# Patient Record
Sex: MALE | Race: WHITE | NOT HISPANIC OR LATINO | Employment: OTHER | URBAN - METROPOLITAN AREA
[De-identification: names, ages, dates, MRNs, and addresses within clinical notes are randomized per-mention and may not be internally consistent; named-entity substitution may affect disease eponyms.]

---

## 2017-01-24 ENCOUNTER — OFFICE VISIT (OUTPATIENT)
Dept: FAMILY MEDICINE | Facility: CLINIC | Age: 42
End: 2017-01-24
Payer: COMMERCIAL

## 2017-01-24 VITALS
WEIGHT: 235 LBS | HEART RATE: 80 BPM | BODY MASS INDEX: 32.9 KG/M2 | DIASTOLIC BLOOD PRESSURE: 82 MMHG | TEMPERATURE: 97.3 F | HEIGHT: 71 IN | SYSTOLIC BLOOD PRESSURE: 124 MMHG

## 2017-01-24 DIAGNOSIS — F41.1 GAD (GENERALIZED ANXIETY DISORDER): ICD-10-CM

## 2017-01-24 DIAGNOSIS — F41.9 ANXIETY: Primary | ICD-10-CM

## 2017-01-24 PROCEDURE — 99214 OFFICE O/P EST MOD 30 MIN: CPT | Performed by: FAMILY MEDICINE

## 2017-01-24 RX ORDER — VENLAFAXINE HYDROCHLORIDE 37.5 MG/1
37.5 CAPSULE, EXTENDED RELEASE ORAL DAILY
Qty: 30 CAPSULE | Refills: 1 | Status: SHIPPED | OUTPATIENT
Start: 2017-01-24 | End: 2020-07-27

## 2017-01-24 ASSESSMENT — ANXIETY QUESTIONNAIRES
5. BEING SO RESTLESS THAT IT IS HARD TO SIT STILL: MORE THAN HALF THE DAYS
1. FEELING NERVOUS, ANXIOUS, OR ON EDGE: MORE THAN HALF THE DAYS
3. WORRYING TOO MUCH ABOUT DIFFERENT THINGS: NOT AT ALL
GAD7 TOTAL SCORE: 15
2. NOT BEING ABLE TO STOP OR CONTROL WORRYING: MORE THAN HALF THE DAYS
7. FEELING AFRAID AS IF SOMETHING AWFUL MIGHT HAPPEN: NEARLY EVERY DAY
6. BECOMING EASILY ANNOYED OR IRRITABLE: NEARLY EVERY DAY

## 2017-01-24 ASSESSMENT — PATIENT HEALTH QUESTIONNAIRE - PHQ9: 5. POOR APPETITE OR OVEREATING: NEARLY EVERY DAY

## 2017-01-24 NOTE — NURSING NOTE
"Chief Complaint   Patient presents with     Elbow Pain     Saturday sprain his elbow opening a drawer. He used a sport wrap on it and work noticed and wanted him to be checked.     Anxiety     panic attacks. Has a very stressful fast paced job. Synptoms of frustration, anger is starting to affect daily life.        Initial /82 mmHg  Pulse 80  Temp(Src) 97.3  F (36.3  C) (Tympanic)  Ht 5' 10.75\" (1.797 m)  Wt 235 lb (106.595 kg)  BMI 33.01 kg/m2 Estimated body mass index is 33.01 kg/(m^2) as calculated from the following:    Height as of this encounter: 5' 10.75\" (1.797 m).    Weight as of this encounter: 235 lb (106.595 kg).  BP completed using cuff size: LISA Duenas ([facility])  "

## 2017-01-24 NOTE — PATIENT INSTRUCTIONS
Please start the Effexor XR 37.5 mg a day.    Follow up in 2 weeks with a phone visit.    If you have a bad side effect of this medication, please call me and I will switch to something like sertraline medication.          Thank you for choosing [facility].  You may be receiving a survey in the mail from Milagro Henning regarding your visit today.  Please take a few minutes to complete and return the survey to let us know how we are doing.      If you have questions or concerns, please contact us via [email or web address] or you can contact your care team at [phone].    Our Clinic hours are:  Monday 6:40 am  to 7:00 pm  Tuesday -Friday 6:40 am to 5:00 pm    The [place] outpatient lab hours are:  Monday - Friday 6:10 am to 4:45 pm  Saturdays 7:00 am to 11:00 am  Appointments are required, call [phone]    If you have clinical questions after hours or would like to schedule an appointment,  call the clinic at [phone].

## 2017-01-24 NOTE — Clinical Note
[facility]  [address] [address]  Phone: [phone]    January 24, 2017      RE: Williams LYONS Joby  [address] [place] [id number]       To whom it may concern:    Williams Hemphill was seen in our clinic today. He may return to work with the following: No working or lifting restrictions on 1/24/2017.          Sincerely,    Chu Ga MD

## 2017-01-24 NOTE — MR AVS SNAPSHOT
After Visit Summary   1/24/2017    Williams Hemphill    MRN: [id number]           Patient Information     Date Of Birth          1975        Visit Information        Provider Department      1/24/2017 10:20 AM Chu Ga MD [facility]        Today's Diagnoses     Anxiety    -  1     JANNIE (generalized anxiety disorder)           Care Instructions    Please start the Effexor XR 37.5 mg a day.    Follow up in 2 weeks with a phone visit.    If you have a bad side effect of this medication, please call me and I will switch to something like sertraline medication.          Thank you for choosing [facility].  You may be receiving a survey in the mail from [employer] regarding your visit today.  Please take a few minutes to complete and return the survey to let us know how we are doing.      If you have questions or concerns, please contact us via Media Temple or you can contact your care team at [phone].    Our Clinic hours are:  Monday 6:40 am  to 7:00 pm  Tuesday -Friday 6:40 am to 5:00 pm    The [place] outpatient lab hours are:  Monday - Friday 6:10 am to 4:45 pm  Saturdays 7:00 am to 11:00 am  Appointments are required, call [phone]    If you have clinical questions after hours or would like to schedule an appointment,  call the clinic at [phone].          Follow-ups after your visit        Who to contact     If you have questions or need follow up information about today's clinic visit or your schedule please contact [facility] directly at [phone].  Normal or non-critical lab and imaging results will be communicated to you by MobileHelphart, letter or phone within 4 business days after the clinic has received the results. If you do not hear from us within 7 days, please contact the clinic through Media Temple or phone. If you have a critical or abnormal lab result, we will notify you by phone as soon as possible.  Submit refill requests through Media Temple  "or call your pharmacy and they will forward the refill request to us. Please allow 3 business days for your refill to be completed.          Additional Information About Your Visit        MyChart Information     Ztoryhart lets you send messages to your doctor, view your test results, renew your prescriptions, schedule appointments and more. To sign up, go to [email or web address] . Click on \"Log in\" on the left side of the screen, which will take you to the Welcome page. Then click on \"Sign up Now\" on the right side of the page.     You will be asked to enter the access code listed below, as well as some personal information. Please follow the directions to create your username and password.     Your access code is: [email or web address]  Expires: 2017 10:55 AM     Your access code will  in 90 days. If you need help or a new code, please call your [place] clinic or [phone].        Care EveryWhere ID     This is your Care EveryWhere ID. This could be used by other organizations to access your [place] medical records  [id number]        Your Vitals Were     Pulse Temperature Height BMI (Body Mass Index)          80 97.3  F (36.3  C) (Tympanic) 5' 10.75\" (1.797 m) 33.01 kg/m2         Blood Pressure from Last 3 Encounters:   17 124/82   01/15/16 129/86   12/29/15 124/79    Weight from Last 3 Encounters:   17 235 lb (106.595 kg)   01/15/16 247 lb 9.6 oz (112.311 kg)   11/11/15 243 lb (110.224 kg)              Today, you had the following     No orders found for display         Today's Medication Changes          These changes are accurate as of: 17 10:55 AM.  If you have any questions, ask your nurse or doctor.               Start taking these medicines.        Dose/Directions    venlafaxine 37.5 MG 24 hr capsule   Commonly known as:  EFFEXOR-XR   Used for:  Anxiety, JANNIE (generalized anxiety disorder)   Started by:  Chu Ga MD        Dose:  37.5 mg   Take 1 capsule (37.5 mg) by " mouth daily   Quantity:  30 capsule   Refills:  1         Stop taking these medicines if you haven't already. Please contact your care team if you have questions.     cyclobenzaprine 10 MG tablet   Commonly known as:  FLEXERIL   Stopped by:  Chu Ga MD           HYDROcodone-acetaminophen 5-325 MG per tablet   Commonly known as:  NORCO   Stopped by:  Chu Ga MD           IBUPROFEN PO   Stopped by:  Chu Ga MD                Where to get your medicines      These medications were sent to [facility] [place] - [place], [place] - [address]  [address] [address]     Phone:  [phone]    - venlafaxine 37.5 MG 24 hr capsule             Primary Care Provider Office Phone # Fax #    Chu Ga [phone]       [facility] [address] [facility] [address] [facility] [address]        Thank you!     Thank you for choosing [facility]  for your care. Our goal is always to provide you with excellent care. Hearing back from our patients is one way we can continue to improve our services. Please take a few minutes to complete the written survey that you may receive in the mail after your visit with us. Thank you!             Your Updated Medication List - Protect others around you: Learn how to safely use, store and throw away your medicines at www.disposemymeds.org.          This list is accurate as of: 1/24/17 10:55 AM.  Always use your most recent med list.                   Brand Name Dispense Instructions for use    acetaminophen 500 MG tablet    TYLENOL    100 tablet    Take 2 tablets (1,000 mg) by mouth every 8 hours       venlafaxine 37.5 MG 24 hr capsule    EFFEXOR-XR    30 capsule    Take 1 capsule (37.5 mg) by mouth daily

## 2017-01-24 NOTE — PROGRESS NOTES
SUBJECTIVE:                                                    Williams Hemphill is a 41 year old male who presents to clinic today for the following health issues:  Chief Complaint   Patient presents with     Elbow Pain     Saturday sprain his elbow opening a drawer. He used a sport wrap on it and work noticed and wanted him to be checked.     Anxiety     panic attacks. Has a very stressful fast paced job. Synptoms of frustration, anger is starting to affect daily life.          Abnormal Mood Symptoms      Duration: on going > one year    Description:  Depression: no   Anxiety: YES  Panic attacks: YES     Accompanying signs and symptoms: see PHQ-9 and JANNIE scores    History (similar episodes/previous evaluation): Paxil use in the past, not helpful    Precipitating or alleviating factors: Fast paced stressful job    Therapies tried and outcome: meditation    Has anxiety.    Feels like panic attacks at times.  Work is stressful.  There is still some financial issues.  He has quit smoking, no caffeine.  No agoraphobia.  Has been on paxil and a similar med and it made him feel numb.  He has siblings on a fast acting medication they use prn.  Wondering about this product.     Musculoskeletal problem/pain      Duration: 1/21    Description  Location: left elbow    Intensity:  1/10    Accompanying signs and symptoms: swelling-better now    History  Previous similar problem: no   Previous evaluation:  none    Precipitating or alleviating factors:  Trauma or overuse: YES- while opening a drawer felt it strain or twisted  Aggravating factors include: lifting    Therapies tried and outcome: ice and support wrap           Problem list and histories reviewed & adjusted, as indicated.  Additional history: as documented    Problem list, Medication list, Allergies, and Medical/Social/Surgical histories reviewed in [facility] and updated as appropriate.    ROS:  CONSTITUTIONAL:NEGATIVE for fever, chills, change in  "weight  INTEGUMENTARY/SKIN: NEGATIVE for worrisome rashes, moles or lesions  MUSCULOSKELETAL: left elbow, resolved  NEURO: NEGATIVE for weakness, dizziness or paresthesias  PSYCHIATRIC: as above    OBJECTIVE:                                                    /82 mmHg  Pulse 80  Temp(Src) 97.3  F (36.3  C) (Tympanic)  Ht 5' 10.75\" (1.797 m)  Wt 235 lb (106.595 kg)  BMI 33.01 kg/m2  Body mass index is 33.01 kg/(m^2).  GENERAL APPEARANCE: healthy, alert and no distress  MS: extremities normal- no gross deformities noted, full rom of left elbow, no findings,   SKIN: no suspicious lesions or rashes  NEURO: Normal strength and tone, mentation intact and speech normal  PSYCH: mentation appears normal and affect normal/bright         ASSESSMENT/PLAN:                                                    1. Anxiety  Discussed anxiety, medication, reason for not using benzos, due to potential addiction, etc, handouts given.  Patient Instructions   Please start the Effexor XR 37.5 mg a day.    Follow up in 2 weeks with a phone visit.    If you have a bad side effect of this medication, please call me and I will switch to something like sertraline medication.          Thank you for choosing [facility].  You may be receiving a survey in the mail from [employer] regarding your visit today.  Please take a few minutes to complete and return the survey to let us know how we are doing.      If you have questions or concerns, please contact us via DocDep or you can contact your care team at [phone].    Our Clinic hours are:  Monday 6:40 am  to 7:00 pm  Tuesday -Friday 6:40 am to 5:00 pm    The [place] outpatient lab hours are:  Monday - Friday 6:10 am to 4:45 pm  Saturdays 7:00 am to 11:00 am  Appointments are required, call [phone]    If you have clinical questions after hours or would like to schedule an appointment,  call the clinic at [phone].        - venlafaxine (EFFEXOR-XR) 37.5 MG 24 hr capsule; " Take 1 capsule (37.5 mg) by mouth daily  Dispense: 30 capsule; Refill: 1    2. JANNIE (generalized anxiety disorder)    - venlafaxine (EFFEXOR-XR) 37.5 MG 24 hr capsule; Take 1 capsule (37.5 mg) by mouth daily  Dispense: 30 capsule; Refill: 1    Left biceps tendon strain resolved  See Patient Instructions  Counseling/Coordination of care is over 15 min in 25 min appt.      Chu Ga MD  [facility]

## 2017-01-25 ASSESSMENT — ANXIETY QUESTIONNAIRES: GAD7 TOTAL SCORE: 15

## 2017-01-25 ASSESSMENT — PATIENT HEALTH QUESTIONNAIRE - PHQ9: SUM OF ALL RESPONSES TO PHQ QUESTIONS 1-9: 5

## 2017-01-31 PROBLEM — F41.1 GAD (GENERALIZED ANXIETY DISORDER): Status: ACTIVE | Noted: 2017-01-31

## 2018-01-08 ENCOUNTER — HOSPITAL ENCOUNTER (OUTPATIENT)
Dept: OCCUPATIONAL THERAPY | Facility: CLINIC | Age: 43
Setting detail: THERAPIES SERIES
End: 2018-01-08
Attending: ORTHOPAEDIC SURGERY
Payer: OTHER MISCELLANEOUS

## 2018-01-08 PROCEDURE — 97110 THERAPEUTIC EXERCISES: CPT | Mod: GO | Performed by: OCCUPATIONAL THERAPIST

## 2018-01-08 PROCEDURE — 97140 MANUAL THERAPY 1/> REGIONS: CPT | Mod: GO | Performed by: OCCUPATIONAL THERAPIST

## 2018-01-08 PROCEDURE — 97165 OT EVAL LOW COMPLEX 30 MIN: CPT | Mod: GO | Performed by: OCCUPATIONAL THERAPIST

## 2018-01-08 PROCEDURE — 40000839 ZZH STATISTIC HAND THERAPY VISIT: Performed by: OCCUPATIONAL THERAPIST

## 2018-01-08 NOTE — PROGRESS NOTES
Hand Therapy Initial Evaluation  01/08/18    General Information/History   Start Of Care Date 01/08/18   Referring Physician Dr. Vela   Orders Date 12/26/18   Medical Diagnosis R ECU subluxation/dislocation    Precautions/Limitations work restriction   Additional Occupational Profile Info/Pertinent history of current problem Pt states he was at work, and working with a large roll of paper when there was a malfunction and pt was trying to stop the roll, his arm was caught between the roll of paper and a fixed juan.  There were broken bones and originally had surgery 6/25 and then had surgery 11/22 for the ECU tendon as well as a CTR.  Pt stating nerve damage with the first surgery with decreased sensation along radial FA and wrist   Previous treatment or current condition pt was initially casted after first surgery, as well as after the most recent surgery.  Has had no therapy.     Past medical history none   How/Where did it occur At work   Onset date of current episode/exacerbation 06/23/18   Date of surgery 11/22/18   Surgical procedure s/p ECU centralization and CTR   Chronicity New   Hand Dominance Right   Affected side Right   Functional limitations perform activities of daily living;perform required work activities;perform desired leisure / sports activities   Reported Symptoms Pain;Loss of Motion/Stiffness;Loss of strength;Numbness;Edema   QuickDASH [Functional Disability Questionnaire; 0-100 (0=no dysfunction; 100=dysfunction)] (UEFI 32/80)   Prior level of function Independent ADL;Independent IADL   Important Activities football, basketball, hiking   Living environment [place]   Patient role/Employment history Employed   Occupation    Employment Status Working in normal job with restrictions  (no use of the Right hand)   Primary Job Tasks Assembly;Gripping/pinching;Pushing/pulling;Prolonged standing;Repetitive tasks;Operating a machine;Lifting;Reaching;Carrying   Patient/Family goals  statement to get back to normal activities, full use of my hand and wrist   Fall Risk Screen   Fall screen completed by OT   Have you fallen 2 or more times in the past year? No   Have you fallen and had an injury in the past year? No   Is patient a fall risk? No   Pain   Pain Primary Pain Report   Primary Pain Report   Location dorsal ulnar and central wrist   Radiation Hand  (small finger)   Pain Quality Aching;Sharp;Shooting   Frequency Intermittent   Scale 8/10  (at worst, 0/10 at best)   Pain Is Worse In The P.m.  (after a days use)   Pain Is Exacerbated By Twisting , Pulling;Gripping;Pinching  (reaching)   Pain Is Relieved By Rest;Ice   Progression Since Onset Gradually Improving   Edema   Edema Distal Wrist Crease   Distal Wrist Crease (measured in cm)   Distal Wrist Crease - - Left 18.5   Distal Wrist Crease - - Right 20   Scar/Wound   Scar/Wound Comments scars to volar wrist, dorsal ulnar wrist and volar FA; all healed with mild adhesions   Tenderness   Overall - Right ECU tendon and attatchment.  Radial head and PIN pain 4/10   Comment Tightness noted in wrist flexors and extensors   ROM   ROM AROM   AROM   AROM Wrist   Comments Pt is able to make a full fist on the right   Wrist   Wrist Extension - Left 72   Wrist Extension - Right 50   Wrist Flexion- Left 71   Wrist Flexion - Right 19   Wrist Supination- Left 87   Wrist Supination - Right 62   Wrist Pronation- Left 85   Wrist Pronation - Right 32   Radial Deviation- Left 20   Radial Deviation - Right 6   Ulnar Deviation- Left 40   Ulnar Deviation - Right 26   Mobility Testing   Mobility Testing (decreased R radial head mobility )   Sensation Findings   Sensation Findings Other (see comments)   Sensation Comments light touch intact, numbness radial FA-wrist-thumb MCP, slight numbness distal ulnar FA-wrist-MCP of SF.  also numbness over volar wrist incision   Strength   Strength Other (see comments)   Strength Comments deferred at this time   Resisted  Muscle Testing   Resisted Muscle Testing (Deferred at this time)   Education Assessment   Preferred Learning Style Listening;Demonstration   Barriers to Learning No barriers   Therapy Interventions   Planned Therapy Interventions Ultrasound;Light Therapy;Paraffin;Fluidotherapy;Strengthening;ROM;Coordination;Stretching;Manual Therapy;Splinting;Education of splint wear, care, fit and precautions;Scar Management;Edema Management;Desensitization;Self Care/Home Management;Adaptive Equipment Training;Energy Conservation/Work Simplifacation Training;Joint Protection Instruction;Home Program  (neuro-dhruv)   Clinical Impression   Criteria for Skilled Therapeutic Interventions Met yes;treatment indicated   OT Diagnosis impaired ADL's   Influenced by the following impairments Pain;Edema;Decreased range of motion;Decreased strength;Impaired sensation  (decreased coordination)   Assessment of Occupational Performance 5 or more Performance Deficits   Identified Performance Deficits work, home management, bathing, functional mobility, cooking, dressing, recreation/hobbies   Clinical Decision Making (Complexity) Low complexity   Therapy Frequency 2x/wk for 4 weeks, decreasing to 1x/wk   Predicted Duration of Therapy Intervention (days/wks) 8 weeks   Risks and Benefits of Treatment have been explained. Yes   Patient, Family & other staff in agreement with plan of care Yes   Clinical Impression Comments Pt presents to therapy with above impairments, following multi-trauma injury at work with multiple surgeries.  Impairments are impacting his ability to complete ADL's and IADL's per PLOF and pt would benefit from skilled services to address impairments and return to IND PLOF.   Hand Goals   Hand Goals Household Chores;Work   Household Chores   Current Functional Task Gripping;Turning   Previous Performance Level Independent   Current Performance Level Severe difficulty  (6/10)   Goal Target Task Open a tight or new jar   Goal Target  Performance Level No difficulty  (pain less than 1/10)   Due Date 03/05/18   Work   Current Functional Task Reaching;Lifting   Previous Performance Level Independent   Current Performance Level Unable   Goal Target Task Hold and manipulate necessary tools  (managing paper rolls)   Goal Target Performance Level No difficulty   Due Date 03/05/18   Total Evaluation Time   Total Evaluation Time (Minutes) 25     Kimberly Hewitt OTR/L

## 2018-01-15 ENCOUNTER — HOSPITAL ENCOUNTER (OUTPATIENT)
Dept: OCCUPATIONAL THERAPY | Facility: CLINIC | Age: 43
Setting detail: THERAPIES SERIES
End: 2018-01-15
Attending: ORTHOPAEDIC SURGERY
Payer: OTHER MISCELLANEOUS

## 2018-01-15 PROCEDURE — 40000839 ZZH STATISTIC HAND THERAPY VISIT: Performed by: OCCUPATIONAL THERAPIST

## 2018-01-15 PROCEDURE — 97140 MANUAL THERAPY 1/> REGIONS: CPT | Mod: GO | Performed by: OCCUPATIONAL THERAPIST

## 2018-01-15 PROCEDURE — 97110 THERAPEUTIC EXERCISES: CPT | Mod: GO | Performed by: OCCUPATIONAL THERAPIST

## 2018-01-15 PROCEDURE — 97022 WHIRLPOOL THERAPY: CPT | Mod: GO | Performed by: OCCUPATIONAL THERAPIST

## 2018-01-22 ENCOUNTER — HOSPITAL ENCOUNTER (OUTPATIENT)
Dept: OCCUPATIONAL THERAPY | Facility: CLINIC | Age: 43
Setting detail: THERAPIES SERIES
End: 2018-01-22
Attending: ORTHOPAEDIC SURGERY
Payer: OTHER MISCELLANEOUS

## 2018-01-22 PROCEDURE — 97110 THERAPEUTIC EXERCISES: CPT | Mod: GO | Performed by: OCCUPATIONAL THERAPIST

## 2018-01-22 PROCEDURE — 97140 MANUAL THERAPY 1/> REGIONS: CPT | Mod: GO | Performed by: OCCUPATIONAL THERAPIST

## 2018-01-22 PROCEDURE — 40000839 ZZH STATISTIC HAND THERAPY VISIT: Performed by: OCCUPATIONAL THERAPIST

## 2018-01-22 PROCEDURE — 97022 WHIRLPOOL THERAPY: CPT | Mod: GO | Performed by: OCCUPATIONAL THERAPIST

## 2018-01-29 ENCOUNTER — HOSPITAL ENCOUNTER (OUTPATIENT)
Dept: OCCUPATIONAL THERAPY | Facility: CLINIC | Age: 43
Setting detail: THERAPIES SERIES
End: 2018-01-29
Attending: ORTHOPAEDIC SURGERY
Payer: OTHER MISCELLANEOUS

## 2018-01-29 PROCEDURE — 40000839 ZZH STATISTIC HAND THERAPY VISIT: Performed by: OCCUPATIONAL THERAPIST

## 2018-01-29 PROCEDURE — 97110 THERAPEUTIC EXERCISES: CPT | Mod: GO | Performed by: OCCUPATIONAL THERAPIST

## 2018-01-29 PROCEDURE — 97140 MANUAL THERAPY 1/> REGIONS: CPT | Mod: GO | Performed by: OCCUPATIONAL THERAPIST

## 2018-02-02 ENCOUNTER — HOSPITAL ENCOUNTER (OUTPATIENT)
Dept: OCCUPATIONAL THERAPY | Facility: CLINIC | Age: 43
Setting detail: THERAPIES SERIES
End: 2018-02-02
Attending: ORTHOPAEDIC SURGERY
Payer: OTHER MISCELLANEOUS

## 2018-02-02 PROCEDURE — 97022 WHIRLPOOL THERAPY: CPT | Mod: GO | Performed by: OCCUPATIONAL THERAPIST

## 2018-02-02 PROCEDURE — 97140 MANUAL THERAPY 1/> REGIONS: CPT | Mod: GO | Performed by: OCCUPATIONAL THERAPIST

## 2018-02-02 PROCEDURE — 97110 THERAPEUTIC EXERCISES: CPT | Mod: GO | Performed by: OCCUPATIONAL THERAPIST

## 2018-02-02 PROCEDURE — 40000839 ZZH STATISTIC HAND THERAPY VISIT: Performed by: OCCUPATIONAL THERAPIST

## 2018-02-16 ENCOUNTER — HOSPITAL ENCOUNTER (OUTPATIENT)
Dept: OCCUPATIONAL THERAPY | Facility: CLINIC | Age: 43
Setting detail: THERAPIES SERIES
End: 2018-02-16
Attending: ORTHOPAEDIC SURGERY
Payer: OTHER MISCELLANEOUS

## 2018-02-16 PROCEDURE — 97140 MANUAL THERAPY 1/> REGIONS: CPT | Mod: GO | Performed by: OCCUPATIONAL THERAPIST

## 2018-02-16 PROCEDURE — 40000839 ZZH STATISTIC HAND THERAPY VISIT: Performed by: OCCUPATIONAL THERAPIST

## 2018-02-21 ENCOUNTER — HOSPITAL ENCOUNTER (OUTPATIENT)
Dept: OCCUPATIONAL THERAPY | Facility: CLINIC | Age: 43
Setting detail: THERAPIES SERIES
End: 2018-02-21
Attending: ORTHOPAEDIC SURGERY
Payer: OTHER MISCELLANEOUS

## 2018-02-21 PROCEDURE — 40000839 ZZH STATISTIC HAND THERAPY VISIT: Performed by: OCCUPATIONAL THERAPIST

## 2018-02-21 PROCEDURE — 97110 THERAPEUTIC EXERCISES: CPT | Mod: GO | Performed by: OCCUPATIONAL THERAPIST

## 2018-02-21 NOTE — PROGRESS NOTES
02/21/18 0500   Notes   Note Type Discharge Summary   Providers   Providers Stephanie Storm, OTR/L, CHT   Referring Physician Dr. Vela   General Information   Rxs Authorized 12 (POC)16 (4-8-17)   Rxs Used 7   Medical Diagnosis R ECU subluxation/dislocation    Insurance WC   Start Of Care Date 01/08/18   Onset date of current episode/exacerbation 06/23/18   Surgical procedure s/p ECU centralization and CTR   Date of surgery 11/22/18   Date for Next MD Appointment 03/07/18   Precautions/Limitations 5# weight restriction for 6 more weeks   Other pertinent information s/p 9w6d   Subjective Measures   Subjective Patient feels like he is doing good. Feels he has good motion, no pain and is functional and ready for discharge.   Initial Pain level 8/10   Current Pain level 0/10   Objective Measures   Objective Measures ROM;Strength;Objective Measure 1;Objective Measure 2;Objective Measure 3   ROM   Location (anatomical) wrist   Location Right   Motion Ext/Flex   ROM Comments 60/50 (WAS 50/19)   Strength   Location Right;Left    122,138   Scruggs Pinch 25,26   Lateral Pinch 25,32   Objective Measure 1   Objective Measure Sup/pro   Details 77/70 ( WAS 62/32   Objective Measure 2   Objective Measure RD/UD   Details 25/30 (Was 6/26)   Therapeutic Exercise   Therapeutic Exercise ROM/AROM   Skilled Interventions To Increase Blood Flow For Improved Healing;To Increase Tissue Extensibility;To Increase Tissue Excursion;To Increase Strength;To Enhance Tendon Gliding;To Enhance Tissue Repair;To Enhance Joint Rom;To Soften Scar Tissue;To Decrease Pain;To Decrease Hypersensitivity   Minutes of Treatment 10 to review home program   ROM/AROM   AROM Wrist All Planes   Sets/Reps 10 reps;HEP   AROM Forearm All Planes   Sets/Reps 10 reps;HEP   AROM Elbow Extension;Flexion   Sets/Reps 10 reps;HEP   PROM   PROM PROM Stretching   PROM Stretching 2 sets;HEP  (wrist flex and ext; with weight, against table or other hand)   Special Techniques    Special Techniques TENDON GLIDING SPECIAL TECHNIQUES   Tendon Gliding Hook fist  (hook series)   Sets/Reps 10 reps;HEP   Strengthening   Strengthening Isotonics Wrist;Isotonics Forearm   Isotonics Wrist All Planes   Sets/Reps 1 set;10 reps   Resistance 1   Isotonics Forearm All Planes   Sets/Reps 1 set;10 reps   Resistance 8 oz  (hammer)   Manual   MFR (IO membrane)   Scar Mob Position Sitting   Scar Mob Location volar wrist and FA, dorsal wrist   Description/ Technique circular;3 dimensions  (ed for HEP)   Hand Goals   Hand Goals Household Chores;Work   Household Chores   Current Functional Task Gripping;Turning   Previous Performance Level Moderate limitations   Current Performance Level (no difficulty)   Goal Target Task Open a tight or new jar   Goal Target Performance Level No difficulty  (pain less than 1/10)   Due Date 03/05/18   Date Goal Met 02/21/18   Work   Current Functional Task Reaching;Lifting   Previous Performance Level Unable   Current Performance Level (no diff)   Goal Target Task Hold and manipulate necessary tools  (managing paper rolls)   Goal Target Performance Level No difficulty   Due Date 03/05/18   Date Goal Met 02/21/18   Assessment   Clinical Impression(s) Comments Patient has made significant progress in all areas and has met functional goals.   Response to Therapy: Improvements Flexibility;Strength;Edema;Pain;Work Performance;Self Care Skills;ROM   Education   Learner Patient   Readiness Eager;Acceptance   Method Explanation;Demonstration   Response Verbalizes understanding   Plan   Homework AROM, massage   Updates to plan of care continue to use within pain tolerance   Plan D/C to home program   Total Session Time   Timed Code Treatment Minutes 10   Total Treatment Time (sum of timed and untimed services) 10   Stephanie Storm OTR/L CHT  Occupational Therapist, Certified Hand Therapist

## 2019-12-12 ENCOUNTER — OFFICE VISIT (OUTPATIENT)
Dept: URGENT CARE | Facility: URGENT CARE | Age: 44
End: 2019-12-12
Payer: COMMERCIAL

## 2019-12-12 VITALS
HEART RATE: 70 BPM | RESPIRATION RATE: 18 BRPM | OXYGEN SATURATION: 98 % | DIASTOLIC BLOOD PRESSURE: 84 MMHG | SYSTOLIC BLOOD PRESSURE: 126 MMHG | TEMPERATURE: 97.8 F

## 2019-12-12 DIAGNOSIS — R07.9 CHEST PAIN, UNSPECIFIED TYPE: Primary | ICD-10-CM

## 2019-12-12 DIAGNOSIS — I45.9 SKIPPED HEART BEATS: ICD-10-CM

## 2019-12-12 PROCEDURE — 99215 OFFICE O/P EST HI 40 MIN: CPT | Performed by: NURSE PRACTITIONER

## 2019-12-12 PROCEDURE — 93000 ELECTROCARDIOGRAM COMPLETE: CPT | Performed by: NURSE PRACTITIONER

## 2019-12-12 RX ORDER — BUPRENORPHINE AND NALOXONE 8; 2 MG/1; MG/1
3 FILM, SOLUBLE BUCCAL; SUBLINGUAL DAILY
Status: ON HOLD | COMMUNITY
End: 2021-08-06

## 2019-12-13 NOTE — NURSING NOTE
"Chief Complaint   Patient presents with     Palpitations     Has been going on for a week. Worse today.  Headache today.  Chest tightness today.  No arm pain.  Under lots of stress. Feels a flutter right now, and skips.        Initial /84 (BP Location: Right arm, Patient Position: Chair, Cuff Size: Adult Large)   Pulse 70   Temp 97.8  F (36.6  C) (Tympanic)   Resp 18   SpO2 98%  Estimated body mass index is 33.01 kg/m  as calculated from the following:    Height as of 1/24/17: 1.797 m (5' 10.75\").    Weight as of 1/24/17: 106.6 kg (235 lb).    Patient presents to the clinic using No DME    Health Maintenance that is potentially due pending provider review:  NONE    n/a    Is there anyone who you would like to be able to receive your results? No  If yes have patient fill out WILFRED  Francisco Mota M.A.        "
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
## 2019-12-18 ENCOUNTER — OFFICE VISIT (OUTPATIENT)
Dept: FAMILY MEDICINE | Facility: CLINIC | Age: 44
End: 2019-12-18
Payer: COMMERCIAL

## 2019-12-18 VITALS
BODY MASS INDEX: 36.31 KG/M2 | WEIGHT: 259.4 LBS | HEART RATE: 64 BPM | DIASTOLIC BLOOD PRESSURE: 78 MMHG | HEIGHT: 71 IN | RESPIRATION RATE: 14 BRPM | SYSTOLIC BLOOD PRESSURE: 136 MMHG | TEMPERATURE: 98.5 F | OXYGEN SATURATION: 98 %

## 2019-12-18 DIAGNOSIS — R00.2 PALPITATIONS: Primary | ICD-10-CM

## 2019-12-18 DIAGNOSIS — E66.9 NON MORBID OBESITY, UNSPECIFIED OBESITY TYPE: ICD-10-CM

## 2019-12-18 PROCEDURE — 99214 OFFICE O/P EST MOD 30 MIN: CPT | Performed by: FAMILY MEDICINE

## 2019-12-18 ASSESSMENT — MIFFLIN-ST. JEOR: SCORE: 2075.82
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
## 2019-12-18 NOTE — PATIENT INSTRUCTIONS
Thank you for choosing [facility].  You may be receiving an email and/or telephone survey request from [facility] Customer Experience regarding your visit today.  Please take a few minutes to respond to the survey to let us know how we are doing.    Contact Cardiac Services  at [phone], to schedule zio patch monitor placement appointment.    If you have persistent chest pain, persistent or worsening breathing difficulty, palpitation, lightheadedness or other concerning symptoms, you should be brought to the ER.    To lose weight  Be consistent with low trans fat and saturated fat diet.  Eat food rich in omega-3-fatty acids as you tolerate. (salmon, olive oil)  Eat 5 cups of vegetables, fruits and whole grains per day.  Half of your plate each meal should be vegetables or fruits or whole grains or a combination of those.  Limit starchy food (white rice, white bread, white pasta, white potatoes) to less than a cup per meal.  Minimize sweets, junk food and fastfood. Limit soda beverages to one serving per day; best to avoid it altogether though.  Exercise: moderate intensity sustained for at least 30 mins per episode, goal of 150 mins per week at least  Combine cardiovascular and resistance exercises.  These exercise recommendations are in addition to your daily activity at work or home.      If you have questions or concerns, please contact us via Gleam or you can contact your care team at [phone].    Our Clinic hours are:  Monday 6:40 am  to 7:00 pm  Tuesday -Friday 6:40 am to 5:00 pm    The [place] outpatient lab hours are:  Monday - Friday 6:10 am to 4:45 pm  Saturdays 7:00 am to 11:00 am  Appointments are required, call [phone]    If you have clinical questions after hours or would like to schedule an appointment,  call the clinic at [phone].    Patient Education     Heart Palpitations    Palpitations are the feeling that your heart is beating hard, fast, or irregular.  "Some describe it as \"pounding\" or \"skipped beats.\" Palpitations may occur in someone with heart disease, but can also occur in a healthy person.  Heart-related causes:    Arrhythmia (a change from the heart's normal rhythm)    Heart valve disease    Disease of the heart muscle    Coronary artery disease    High blood pressure  Non-heart-related causes:    Certain medicines such as asthma inhalers and decongestants    Some herbal supplements, energy drinks and pills, and weight loss pills    Illegal stimulant drugs such as cocaine, crank, methamphetamine, PCP, bath salts, or ecstasy    Caffeine, alcohol, and tobacco    Medical conditions such as thyroid disease, anemia, anxiety, and panic disorder  Sometimes the cause can't be found.  Home care  Follow these home care tips:    Don't use too much caffeine, alcohol, tobacco, or any stimulant drugs.    Tell your doctor about any prescription or over-the-counter or herbal medicines you take.  Follow-up care    Follow up with your doctor, or as advised.  Call 911  This is the fastest and safest way to get to the emergency department. The paramedics can also begin treatment on the way to the hospital, if needed.  Don't wait until your symptoms are severe to call 911. These are reasons to call 911:    Chest pain    Shortness of breath    Feeling lightheaded, faint, or dizzy    Fainting or loss of consciousness    Very irregular heartbeat    Rapid heartbeat that makes you uncomfortable    Slower than usual heart rate associated with symptoms    Slower than usual heart rate    Chest pain with weakness, dizziness, heavy sweating, nausea, or vomiting    Extreme drowsiness or confusion    Weakness of an arm or leg, or on 1 side of the face    Difficulty with speech or vision  When to seek medical advice  Call your healthcare provider right away if you have palpitations and any of the following:    Weakness    Dizziness    Lightheadedness    Fainting  Date Last Reviewed: " 4/27/2016 2000-2018 [employer]. [address] [place] [address], [place] [address]. All rights reserved. This information is not intended as a substitute for professional medical care. Always follow your healthcare professional's instructions.           Patient Education     Weight Management: Getting Started  Healthy bodies come in all shapes and sizes. Not all bodies are made to be thin. For some people, a healthy weight is higher than the average weight listed on weight charts. Your healthcare provider can help you decide on a healthy weight for you.    Reasons to lose weight  Losing weight can help with some health problems, such as high blood pressure, heart disease, diabetes, sleep apnea, and arthritis. You may also feel more energy.  Set your long-term goal  Your goal doesn't even have to be a specific weight. You may decide on a fitness goal (such as being able to walk 10 miles a week), or a health goal (such as lowering your blood pressure). Choose a goal that is measurable and reasonable, so you know when you've reached it. A goal of reaching a BMI of less than 25 is not always reasonable (or possible).   Make an action plan  Habits don t change overnight. Setting your goals too high can leave you feeling discouraged if you can t reach them. Be realistic. Choose one or two small changes you can make now. Set an action plan for how you are going to make these changes. When you can stick to this plan, keep making a few more small changes. Taking small steps will help you stay on the path to success.  Track your progress  Write down your goals. Then, keep a daily record of your progress. Write down what you eat and how active you are. This record lets you look back on how much you ve done. It may also help when you re feeling frustrated. Reward yourself for success. Even if you don t reach every goal, give yourself credit for what you do get done.  Get support  Encouragement from others can help make  losing weight easier. Ask your family members and friends for support. They may even want to join you. Also look to your healthcare provider, registered dietitian, and  for help. Your local hospital can give you more information about nutrition, exercise, and weight loss. Be sure to get a thorough checkup before you start any exercise program or change your diet.  Date Last Reviewed: 4/1/2018 2000-2018 [employer]. [address] [place] [address], [place] [address]. All rights reserved. This information is not intended as a substitute for professional medical care. Always follow your healthcare professional's instructions.           Patient Education     Weight Management: Healthy Eating  Food is your body s fuel. You can t live without it. The key is to give your body enough nutrients and energy without eating too much. Reading food labels can help you make healthy choices. Also, learn new eating habits to manage your weight. Nutrition labels are being redesigned by the FDA to emphasize the number of calories being consumed as well as the amount of more nutrients, such as added sugars, vitamin D, and potassium.     All the values on the label are based on one serving. The serving size is the average portion. Remember to multiply the values on the label by the number of servings you eat.   Eat less fat  A gram of fat has almost 2.5 times the calories of a gram of protein or carbohydrates. Try to balance your food choices so that only 20% to 35% of your calories comes from total fat. This means an average of 2  to 3  grams of fat for each 100 calories you eat.  Eat more fiber  High-fiber foods are digested more slowly than low-fiber foods, so you feel full longer. Try to get at least 25 grams of fiber each day for a 2000 calorie diet. Foods high in fiber include:    Vegetables and fruits    Whole-grain or bran breads, pastas, and cereals    Legumes (beans) and peas  As you start to eat  more fiber, be sure to drink plenty of water to keep your digestive system working smoothly.  Tips  Do's and don'ts include:     Don t skip meals. This often leads to overeating later on. It s best to spread your eating throughout the day.    Eat a variety of foods, not just a few favorites.    If you find yourself eating when you re not hungry, ask yourself why. Many of us eat when we re bored, stressed, or just to be polite. Listen to your body. If you re not hungry, get busy doing something else instead of eating.    Eat slower, shooting for 20 to 30 minutes for each meal. It takes 20 minutes for your stomach to tell your brain that it s full. Slow eaters tend to eat less and are still satisfied, while fast eaters may tend to be overeaters.     Pay attention to what you eat. Don t read or watch TV during your meal.  Date Last Reviewed: 4/1/2018 2000-2018 [employer]. [address] [place] [address]. All rights reserved. This information is not intended as a substitute for professional medical care. Always follow your healthcare professional's instructions.           Patient Education     Weight Management: Exercise and Activity    Studies show that people who exercise are the most likely to lose weight and keep it off. Exercise burns calories. It helps build muscle to make your body stronger. Make exercise an important part of your weight-management plan.  Make activity part of your day  You may not think you have the time to exercise. But you can work activity into your daily life--you just need to be committed. Take 10 minutes out of your lunch hour to take a walk. Walk to the [employer] to get your paper instead of having it delivered. Make it a habit to take the stairs instead of the elevator. Park in a far away parking spot instead of the closest. You ll be surprised at how fast these little changes can make a difference.  Some people really cannot walk very far, and tire out  quickly with exercise. Instead of becoming discouraged, resolve to do what you can do, and work to make that a regular frequent habit.   The benefits of exercise  Exercise offers many benefits including:     Exercise increases your metabolism (the speed at which your body burns calories).    Regular exercise can increase the amount of muscle in your body. Muscle burns calories faster than fat. The more muscle you have, the more calories you burn.    Exercise gives you energy and curbs your appetite.    Exercise decreases stress and helps you sleep better. Find out for yourself what time of day works best for you.  Make exercise fun  Exercise can be fun. Choose an activity you enjoy. You may even get a friend to do it with you:    Take a resistance-training or aerobics class    Join a team sport    Take a dance class    Walk the dog    Ride a bike  If you have health problems, be sure to ask your healthcare provider before you start an exercise program. Have a  help you develop a plan that s safe for you.   Date Last Reviewed: 4/1/2018 2000-2018 [employer]. [address] [place] [address], [place] [address]. All rights reserved. This information is not intended as a substitute for professional medical care. Always follow your healthcare professional's instructions.

## 2019-12-23 PROBLEM — E66.9 NON MORBID OBESITY, UNSPECIFIED OBESITY TYPE: Status: ACTIVE | Noted: 2019-12-23

## 2020-03-22 ENCOUNTER — VIRTUAL VISIT (OUTPATIENT)
Dept: FAMILY MEDICINE | Facility: OTHER | Age: 45
End: 2020-03-22

## 2020-03-23 NOTE — PROGRESS NOTES
"Date: 2020 20:32:05  Clinician: VERONICA Toure  Clinician NPI: [id number]  Patient: Williams Hemphill  Patient : 1975  Patient Address: [address] [place] [id number]  Patient Phone: [phone]  Visit Protocol: URI  Patient Summary:  Williams is a 44 year old ( : 1975 ) male who initiated a Visit for cold, sinus infection, or influenza. When asked the question \"Please sign me up to receive news, health information and promotions. \", Williams responded \"No\".    Williams states his symptoms started suddenly 3-6 days ago.   His symptoms consist of rhinitis, enlarged lymph nodes, facial pain or pressure, a sore throat, a cough, nasal congestion, malaise, and a headache. He is experiencing difficulty breathing due to nasal congestion but he is not short of breath.   Symptom details     Nasal secretions: The color of his mucus is white.    Cough: Williams coughs every 5-10 minutes and his cough is not more bothersome at night. Phlegm does not come into his throat when he coughs. He believes his cough is caused by post-nasal drip.     Sore throat: Williams reports having moderate throat pain (4-6 on a 10 point pain scale), does not have exudate on his tonsils, and can swallow liquids. The lymph nodes in his neck are enlarged. A rash has not appeared on the skin since the sore throat started.     Facial pain or pressure: The facial pain or pressure does not feel worse when bending or leaning forward.     Headache: He states the headache is moderate (4-6 on a 10 point pain scale).      Williams denies having ear pain, myalgias, wheezing, chills, teeth pain, and fever. He also denies double sickening (worsening symptoms after initial improvement), taking antibiotic medication for the symptoms, and having recent facial or sinus surgery in the past 60 days.   Precipitating events  Within the past week, Williams has not been exposed to someone with strep throat. He has not recently been exposed to someone with " influenza. Williams has not been in close contact with any high risk individuals.   Pertinent COVID-19 (Coronavirus) information  Williams has not traveled internationally or to the areas where COVID-19 (Coronavirus) is widespread, including cruise ship travel in the last 14 days before the start of his symptoms.   Williams has not had a close contact with a laboratory-confirmed COVID-19 patient within 14 days of symptom onset. He also has not had a close contact with a suspected COVID-19 patient within 14 days of symptom onset.   Williams is not a healthcare worker and does not work in a healthcare facility.   Pertinent medical history  Williams needs a return to work/school note.   Weight: 235 lbs   Williams does not smoke or use smokeless tobacco.   Weight: 235 lbs    MEDICATIONS: ibuprofen oral, ALLERGIES: NKDA  Clinician Response:  Dear Williams,   Based on the information you have provided, you do have symptoms that are consistent with Coronavirus (COVID-19).  The coronavirus causes mild to severe respiratory illness with the most common symptoms including fever, cough and difficulty breathing. Unfortunately, many viruses cause similar symptoms and it can be difficult to distinguish between viruses, especially in mild cases, so we are presuming that anyone with cough or fever has coronavirus at this time.  Coronavirus/COVID-19 has reached the point of community spread in [place], meaning that we are finding the virus in people with no known exposure risk for logan the virus. Given the increasing commonness of coronavirus in the community we are no longer testing patients who are not critically ill.  If you are a health care worker, you should refer to your employee health office for instructions about testing and returning to work.  For everyone else who has cough or fever, you should assume you are infected with coronavirus. Since you will not be tested but have symptoms that may be consistent with  coronavirus, the CDC recommends you stay in self-isolation until these three things have happened:    You have had no fever for at least 72 hours (that is three full days of no fever without the use of medicine that reduces fevers)    AND   Other symptoms have improved (for example, when your cough or shortness of breath have improved)   AND   At least 7 days have passed since your symptoms first appeared.   How to Isolate:   Isolate yourself at home.  Do Not allow any visitors  Do Not go to work or school  Do Not go to Judaism,  centers, shopping, or other public places.  Do Not shake hands.  Avoid close contact with others (hugging, kissing).   Protect Others:   Cover Your Mouth and Nose with a mask, disposable tissue or wash cloth to avoid spreading germs to others.  Wash your hands and face frequently with soap and water.   We know it can be scary to hear that you might have COVID-19. Our team can help track your symptoms and make sure you are doing ok over the next two weeks using a program called [employer] to keep in touch. When you receive an email from [employer], please consider enrolling in our monitoring program. There is no cost to you for monitoring. Here is a URL where you can learn more: [email or web address]  Managing Symptoms:   At this time, we primarily recommend Tylenol (Acetaminophen) for fever or pain. If you have liver or kidney problems, contact your primary care provider for instructions on use of tylenol. Adults can take 650 mg (two 325 mg pills) by mouth every 4-6 hours as needed OR 1,000 mg (two 500 mg pills) every 8 hours as needed. MAXIMUM DAILY DOSE: 3,000mg. For children, refer to dosing on bottle based on age or weight.   If you develop significant shortness of breath that prevents you from doing normal activities, please call 911 or proceed to the nearest emergency room and alert them immediately that you have been in self-isolation for possible coronavirus.   For more information about COVID19 and options for caring for yourself at home, please visit the CDC website at https://www.cdc.gov/coronavirus/2019-ncov/about/steps-when-sick.htmlFor more options for care at [facility], please visit our website at [email or web address]    Diagnosis: Cough  Diagnosis ICD: R05  Prescription: acetaminophen (Tylenol Extra Strength) 500 mg oral tablet 60 tablet, 0 days supply. Take 2 tablets by mouth every 8 hours as needed for fever and pain. Refills: 0, Refill as needed: no, Allow substitutions: yes  Prescription: fluticasone propionate (Flonase Allergy Relief) 50 mcg/actuation nasal spray,suspension 1 60 spray aerosol with adapter, 0 days supply. Inhale 1 spray in each nostril intranasally 1 time per day as needed. Refills: 0, Refill as needed: no, Allow substitutions: yes  Prescription: Ayr Saline Nasal Neti Rinse sinus irrigation packet with rinse device 1 40 packet kit, 0 days supply. Take 1 packet with rinse device 2 times per day as needed for nasal congestion. Refills: 0, Refill as needed: no, Allow substitutions: yes  Prescription: benzonatate (Tessalon Perles) 100 mg oral capsule 45 capsule, 0 days supply. Take 1 capsule by mouth 3 times per day as needed for cough. Refills: 0, Refill as needed: no, Allow substitutions: yes  Pharmacy: [facility] [place] - [phone] - [address], [place] [id number]

## 2020-05-04 ENCOUNTER — TELEPHONE (OUTPATIENT)
Dept: FAMILY MEDICINE | Facility: CLINIC | Age: 45
End: 2020-05-04

## 2020-05-04 NOTE — TELEPHONE ENCOUNTER
Reason for call:    Symptom or request:     Patient called stating that he faxed a letter to clinic, to be off of work due to son's exposure to Covid. Son lives with Dad.  He is requesting to  form vs having it fax. Does not want to confuse work.     The fax was sent at 10a on Monday.     Best Time:  any    Can we leave a detailed message on this number?  YES     Nohemy COLLINS  Station

## 2020-05-06 NOTE — TELEPHONE ENCOUNTER
Tried to contact patient, but only got a recording that the subscriber is not accepting calls. Marni Kerr on 5/6/2020 at 3:30 PM

## 2020-05-06 NOTE — TELEPHONE ENCOUNTER
Form received.  However note below states:  Patient called stating that he faxed a letter to clinic, to be off of work due to son's exposure to Covid. Son lives with Dad.  He is requesting to  form vs having it fax. Does not want to confuse work.     Form indicates he would like the last progress exam notes from his visits faxed to his employer along with form (form is in Pending Phone Call tray on forms desk).    Patient as last seen by our team 12/18/19, had a virtual visit 3/22/20 ([facility]?) and then was seen 4/20/20 by a [facility] beds at the [facility] location.      I'm not sure if patient is requesting time off for son's exposure to COVID, his office visit 12/18, [facility] visit 3/22 or his outside facility visit 4/20.    It was too late to contact patient.  Can we call patient to see what he needs?  Thank you!

## 2020-05-08 NOTE — TELEPHONE ENCOUNTER
Gladis was asking:  I'm not sure if patient is requesting time off for son's exposure to COVID, his office visit 12/18, [facility] visit 3/22 or his outside facility visit 4/20.      Attempted to contact patient, no answer, left voice message to call back.

## 2020-05-08 NOTE — TELEPHONE ENCOUNTER
Patient called back-- asking for time off from 04/18-05/14/2020 due separate exposures from son wife and himself.  And the need to self quarantine from each exposure. Please provide last OV/on care note.    Nohemy COLLINS  Station

## 2020-05-13 NOTE — TELEPHONE ENCOUNTER
Patient notified.  Requesting just the [facility] notes be faxed to his work.  Patient given HIMS # to contact.    Christiana THRASHER RN BSN

## 2020-05-13 NOTE — TELEPHONE ENCOUNTER
"It was too late to notify patient.  Can we notify patient of Dr. Ga's response to patient's request to complete a Medical Status form.    \"I am not able to complete the form.  I have not seen this patient is over 3 years.  In addition I have no other documentation regarding his son or wife. Dr. Ga\".      Thank you,  Gladis Brionesor  "

## 2020-06-23 ENCOUNTER — COMMUNICATION - HEALTHEAST (OUTPATIENT)
Dept: SCHEDULING | Facility: CLINIC | Age: 45
End: 2020-06-23

## 2020-06-24 ENCOUNTER — VIRTUAL VISIT (OUTPATIENT)
Dept: FAMILY MEDICINE | Facility: OTHER | Age: 45
End: 2020-06-24

## 2020-06-24 NOTE — PROGRESS NOTES
"Date: 2020 06:09:17  Clinician: Gladis Batista  Clinician NPI: [id number]  Patient: Williams Hemphill  Patient : 1975  Patient Address: [address] [place] [address]  Patient Phone: [phone]  Visit Protocol: URI  Patient Summary:  Williams is a 44 year old ( : 1975 ) male who initiated a Visit for COVID-19 (Coronavirus) evaluation and screening. When asked the question \"Please sign me up to receive news, health information and promotions. \", Williams responded \"No\".    Williams states his symptoms started 1-2 days ago.   His symptoms consist of nausea, vomiting, malaise, a headache, chills, a sore throat, myalgia, a cough, and nasal congestion. He is experiencing difficulty breathing due to nasal congestion but he is not short of breath. Williams also feels feverish.   Symptom details     Nasal secretions: The color of his mucus is white.    Cough: Williams coughs every 5-10 minutes and his cough is more bothersome at night. Phlegm does not come into his throat when he coughs. He does not believe his cough is caused by post-nasal drip.     Sore throat: Williams reports having moderate throat pain (4-6 on a 10 point pain scale), does not have exudate on his tonsils, and can swallow liquids. He is not sure if the lymph nodes in his neck are enlarged. A rash has not appeared on the skin since the sore throat started.     Temperature: His current temperature is 100.3 degrees Fahrenheit. Williams has had a temperature over 100 degrees Fahrenheit for 1-2 days.     Headache: He states the headache is moderate (4-6 on a 10 point pain scale).      Williams denies having wheezing, teeth pain, ageusia, diarrhea, rhinitis, ear pain, anosmia, and facial pain or pressure. He also denies having recent facial or sinus surgery in the past 60 days, taking antibiotic medication in the past month, and having a sinus infection within the past year.   Precipitating events  Within the past week, Williams has not been " exposed to someone with strep throat. He has not recently been exposed to someone with influenza. Williams has not been in close contact with any high risk individuals.   Pertinent COVID-19 (Coronavirus) information  In the past 14 days, Williams has not worked in a congregate living setting.   He does not work or volunteer as healthcare worker or a  and does not work or volunteer in a healthcare facility.   Williams also has not lived in a congregate living setting in the past 14 days. He does not live with a healthcare worker.   Williams has had a close contact with a laboratory-confirmed COVID-19 patient within 14 days of symptom onset. Additional information about contact with COVID-19 (Coronavirus) patient as reported by the patient (free text): two second hand exposures from a friend here at home and sister here at home.   Pertinent medical history  Williams needs a return to work/school note.   Weight: 225 lbs   Williams does not smoke or use smokeless tobacco.   Weight: 225 lbs    MEDICATIONS: No current medications, ALLERGIES: NKDA  Clinician Response:  Dear Williams,      Your symptoms show that you may have coronavirus (COVID-19). This illness can cause fever, cough and trouble breathing. Many people get a mild case and get better on their own. Some people can get very sick.  What should I do?  We would like to test you for this virus.   1. Please call [phone] to schedule your visit. Explain that you were referred by [facility] to have a COVID-19 test. Be ready to share your [facility] visit ID number.  The following will serve as your written order for this COVID Test, ordered by me, for the indication of suspected COVID [Z20.828]: The test will be ordered in [facility], our electronic health record, after you are scheduled. It will show as ordered and authorized by Umair Oquendo MD.  Order: COVID-19 (Coronavirus) PCR for SYMPTOMATIC testing from [facility].      2. When it's time for your COVID test:  Stay at  "least 6 feet away from others. (If someone will drive you to your test, stay in the backseat, as far away from the  as you can.)   Cover your mouth and nose with a mask, tissue or washcloth.  Go straight to the testing site. Don't make any stops on the way there or back.      3.Starting now: Stay home and away from others (self-isolate) until:   You've had no fever---and no medicine that reduces fever---for 3 full days (72 hours). And...   Your other symptoms have gotten better. For example, your cough or breathing has improved. And...   At least 10 days have passed since your symptoms started.       During this time, don't leave the house except for testing or medical care.   Stay in your own room, even for meals. Use your own bathroom if you can.   Stay away from others in your home. No hugging, kissing or shaking hands. No visitors.  Don't go to work, school or anywhere else.    Clean \"high touch\" surfaces often (doorknobs, counters, handles, etc.). Use a household cleaning spray or wipes. You'll find a full list of  on the EPA website: www.epa.gov/pesticide-registration/list-n-disinfectants-use-against-sars-cov-2.   Cover your mouth and nose with a mask, tissue or washcloth to avoid spreading germs.  Wash your hands and face often. Use soap and water.  Caregivers in these groups are at risk for severe illness due to COVID-19:  o People 65 years and older  o People who live in a nursing home or long-term care facility  o People with chronic disease (lung, heart, cancer, diabetes, kidney, liver, immunologic)  o People who have a weakened immune system, including those who:   Are in cancer treatment  Take medicine that weakens the immune system, such as corticosteroids  Had a bone marrow or organ transplant  Have an immune deficiency  Have poorly controlled HIV or AIDS  Are obese (body mass index of 40 or higher)  Smoke regularly   o Caregivers should wear gloves while washing dishes, handling laundry " and cleaning bedrooms and bathrooms.  o Use caution when washing and drying laundry: Don't shake dirty laundry, and use the warmest water setting that you can.  o For more tips, go to www.cdc.gov/coronavirus/2019-ncov/downloads/10Things.pdf.      How can I take care of myself?   Get lots of rest. Drink extra fluids (unless a doctor has told you not to).   Take Tylenol (acetaminophen) for fever or pain. If you have liver or kidney problems, ask your family doctor if it's okay to take Tylenol.   Adults can take either:    650 mg (two 325 mg pills) every 4 to 6 hours, or...   1,000 mg (two 500 mg pills) every 8 hours as needed.    Note: Don't take more than 3,000 mg in one day. Acetaminophen is found in many medicines (both prescribed and over-the-counter medicines). Read all labels to be sure you don't take too much.   For children, check the Tylenol bottle for the right dose. The dose is based on the child's age or weight.    If you have other health problems (like cancer, heart failure, an organ transplant or severe kidney disease): Call your specialty clinic if you don't feel better in the next 2 days.       Know when to call 911. Emergency warning signs include:    Trouble breathing or shortness of breath Pain or pressure in the chest that doesn't go away Feeling confused like you haven't felt before, or not being able to wake up Bluish-colored lips or face.  Where can I get more information?   [facility] -- About COVID-19: [email or web address]   CDC -- What to Do If You're Sick: www.cdc.gov/coronavirus/2019-ncov/about/steps-when-sick.html   CDC -- Ending Home Isolation: www.cdc.gov/coronavirus/2019-ncov/hcp/disposition-in-home-patients.html   CDC -- Caring for Someone: www.cdc.gov/coronavirus/2019-ncov/if-you-are-sick/care-for-someone.html   [facility] -- Interim Guidance for Hospital Discharge to Home: www.health.[facility].mn.us/diseases/coronavirus/hcp/hospdischarge.pdf   [facility]  clinical trials (COVID-19 research studies): clinicalaffairs.[facility]/n-clinical-trials    Below are the COVID-19 hotlines at the [place] Department of Health ([facility]). Interpreters are available.    For health questions: Call 785-668-1006 or 1-514.731.7833 (7 a.m. to 7 p.m.) For questions about schools and childcare: Call 417-117-6504 or 1-104.528.5375 (7 a.m. to 7 p.m.)    Diagnosis: Cough  Diagnosis ICD: R05

## 2020-06-25 DIAGNOSIS — Z20.822 SUSPECTED COVID-19 VIRUS INFECTION: Primary | ICD-10-CM

## 2020-06-25 PROCEDURE — U0003 INFECTIOUS AGENT DETECTION BY NUCLEIC ACID (DNA OR RNA); SEVERE ACUTE RESPIRATORY SYNDROME CORONAVIRUS 2 (SARS-COV-2) (CORONAVIRUS DISEASE [COVID-19]), AMPLIFIED PROBE TECHNIQUE, MAKING USE OF HIGH THROUGHPUT TECHNOLOGIES AS DESCRIBED BY CMS-2020-01-R: HCPCS | Performed by: FAMILY MEDICINE

## 2020-06-25 PROCEDURE — 99207 ZZC NO CHARGE NURSE ONLY: CPT

## 2020-06-25 NOTE — LETTER
June 27, 2020        Williams Hemphill  [address] [place] [address]    This letter provides a written record that you were tested for COVID-19 on 6/25/20.       Your result was negative. This means that we didn t find the virus that causes COVID-19 in your sample. A test may show negative when you do actually have the virus. This can happen when the virus is in the early stages of infection, before you feel illness symptoms.    If you have symptoms   Stay home and away from others (self-isolate) until you meet ALL of the guidelines below:    You ve had no fever--and no medicine that reduces fever--for 3 full days (72 hours). And      Your other symptoms have gotten better. For example, your cough or breathing has improved. And     At least 10 days have passed since your symptoms started.    During this time:    Stay home. Don t go to work, school or anywhere else.     Stay in your own room, including for meals. Use your own bathroom if you can.    Stay away from others in your home. No hugging, kissing or shaking hands. No visitors.    Clean  high touch  surfaces often (doorknobs, counters, handles, etc.). Use a household cleaning spray or wipes. You can find a full list on the EPA website at www.epa.gov/pesticide-registration/list-n-disinfectants-use-against-sars-cov-2.    Cover your mouth and nose with a mask, tissue or washcloth to avoid spreading germs.    Wash your hands and face often with soap and water.    Going back to work  Check with your employer for any guidelines to follow for going back to work.    Employers: This document serves as formal notice that your employee tested negative for COVID-19, as of the testing date shown above.

## 2020-06-26 LAB
SARS-COV-2 RNA SPEC QL NAA+PROBE: NOT DETECTED
SPECIMEN SOURCE: NORMAL

## 2020-06-29 ENCOUNTER — NURSE TRIAGE (OUTPATIENT)
Dept: NURSING | Facility: CLINIC | Age: 45
End: 2020-06-29

## 2020-06-29 NOTE — TELEPHONE ENCOUNTER
Patient calls for COVID19 results from 6/25/20. Today is the first day he woke up without a fever and he is still coughing. Recommendations given per letter below.     DAYAN Mcbride RN      Coronavirus (COVID-19) Notification    Lab Result   Lab test 2019-nCoV rRt-PCR OR SARS-COV-2 PCR    Nasopharyngeal AND/OR Oropharyngeal swab is NEGATIVE for 2019-nCoV RNA [OR] SARS-COV-2 RNA (COVID-19) RNA    Your result was negative. This means that we didn't find the virus that causes COVID-19 in your sample. A test may show negative when you do actually have the virus. This can happen when the virus is in the early stages of infection, before you feel illness symptoms.    If you have symptoms   Stay home and away from others (self-isolate) until you meet ALL of the guidelines below:    You've had no fever--and no medicine that reduces fever--for 3 full days (72 hours). And      Your other symptoms have gotten better. For example, your cough or breathing has improved. And     At least 10 days have passed since your symptoms started.    During this time:    Stay home. Don't go to work, school or anywhere else.     Stay in your own room, including for meals. Use your own bathroom if you can.    Stay away from others in your home. No hugging, kissing or shaking hands. No visitors.    Clean  high touch  surfaces often (doorknobs, counters, handles, etc.). Use a household cleaning spray or wipes. You can find a full list on the EPA website at www.epa.gov/pesticide-registration/list-n-disinfectants-use-against-sars-cov-2.    Cover your mouth and nose with a mask, tissue or washcloth to avoid spreading germs.    Wash your hands and face often with soap and water.    Going back to work  Check with your employer for any guidelines to follow for going back to work.  You are sent a letter for your Employer which will serve as formal document notice that you, the employee, tested negative for COVID-19, as of the testing date shown  above.    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    Where can I get more information?     [employer] [facility]: [email or web address]    Coronavirus Basics: www.health.[facility].mn.us/diseases/coronavirus/basics.html    [facility] Hotline (250-143-0935)    DAYAN Mcbride RN

## 2020-07-07 DIAGNOSIS — Z11.59 SCREENING FOR VIRAL DISEASE: ICD-10-CM

## 2020-07-10 LAB
DEPRECATED S PYO AG THROAT QL EIA: NORMAL
SPECIMEN SOURCE: NORMAL

## 2020-07-10 NOTE — PROGRESS NOTES
"Date: 2020 13:59:17  Clinician: Shadi Perez  Clinician NPI: [id number]  Patient: Williams Hemphill  Patient : 1975  Patient Address: [address] [place] [address]  Patient Phone: [phone]  Visit Protocol: URI  Patient Summary:  Williams is a 44 year old ( : 1975 ) male who initiated a Visit for COVID-19 (Coronavirus) evaluation and screening. When asked the question \"Please sign me up to receive news, health information and promotions. \", Williams responded \"Yes\".    Williams states his symptoms started 1-2 days ago.   His symptoms consist of diarrhea, malaise, a headache, a sore throat, a cough, and nasal congestion. He is experiencing mild difficulty breathing with activities but can speak normally in full sentences.   Symptom details     Nasal secretions: The color of his mucus is white.    Cough: Williams coughs every 5-10 minutes and his cough is more bothersome at night. Phlegm does not come into his throat when he coughs. He does not believe his cough is caused by post-nasal drip.     Sore throat: Williams reports having moderate throat pain (4-6 on a 10 point pain scale), does not have exudate on his tonsils, and can swallow liquids. He is not sure if the lymph nodes in his neck are enlarged. A rash has not appeared on the skin since the sore throat started.     Headache: He states the headache is mild (1-3 on a 10 point pain scale).      Williams denies having wheezing, nausea, teeth pain, ageusia, vomiting, rhinitis, ear pain, chills, myalgias, anosmia, facial pain or pressure, and fever. He also denies having recent facial or sinus surgery in the past 60 days and taking antibiotic medication in the past month.   Precipitating events  Within the past week, Williams has not been exposed to someone with strep throat. He has not recently been exposed to someone with influenza. Williams has not been in close contact with any high risk individuals.   Pertinent COVID-19 (Coronavirus) " information  In the past 14 days, Williams has not worked in a congregate living setting.   He does not work or volunteer as healthcare worker or a  and does not work or volunteer in a healthcare facility.   Williams also has not lived in a congregate living setting in the past 14 days. He does not live with a healthcare worker.   Williams has not had a close contact with a laboratory-confirmed COVID-19 patient within 14 days of symptom onset.   Pertinent medical history  Williams needs a return to work/school note.   Weight: 225 lbs   Williams does not smoke or use smokeless tobacco.   Weight: 225 lbs    MEDICATIONS: No current medications, ALLERGIES: NKDA  Clinician Response:  Dear Williams,   Your symptoms show that you may have coronavirus (COVID-19). This illness can cause fever, cough and trouble breathing. Many people get a mild case and get better on their own. Some people can get very sick.  What should I do?  We would like to test you for this virus.   1. Please call [phone] to schedule your visit. Explain that you were referred by [facility] to have a COVID-19 test. Be ready to share your [facility] visit ID number.  The following will serve as your written order for this COVID Test, ordered by me, for the indication of suspected COVID [Z20.828]: The test will be ordered in [facility], our electronic health record, after you are scheduled. It will show as ordered and authorized by Umair Oquendo MD.  Order: COVID-19 (Coronavirus) PCR for SYMPTOMATIC testing from [facility].      2. When it's time for your COVID test:  Stay at least 6 feet away from others. (If someone will drive you to your test, stay in the backseat, as far away from the  as you can.)   Cover your mouth and nose with a mask, tissue or washcloth.  Go straight to the testing site. Don't make any stops on the way there or back.      3.Starting now: Stay home and away from others (self-isolate) until:   You've had no fever---and no medicine  "that reduces fever---for 3 full days (72 hours). And...   Your other symptoms have gotten better. For example, your cough or breathing has improved. And...   At least 10 days have passed since your symptoms started.       During this time, don't leave the house except for testing or medical care.   Stay in your own room, even for meals. Use your own bathroom if you can.   Stay away from others in your home. No hugging, kissing or shaking hands. No visitors.  Don't go to work, school or anywhere else.    Clean \"high touch\" surfaces often (doorknobs, counters, handles, etc.). Use a household cleaning spray or wipes. You'll find a full list of  on the EPA website: www.epa.gov/pesticide-registration/list-n-disinfectants-use-against-sars-cov-2.   Cover your mouth and nose with a mask, tissue or washcloth to avoid spreading germs.  Wash your hands and face often. Use soap and water.  Caregivers in these groups are at risk for severe illness due to COVID-19:  o People 65 years and older  o People who live in a nursing home or long-term care facility  o People with chronic disease (lung, heart, cancer, diabetes, kidney, liver, immunologic)  o People who have a weakened immune system, including those who:   Are in cancer treatment  Take medicine that weakens the immune system, such as corticosteroids  Had a bone marrow or organ transplant  Have an immune deficiency  Have poorly controlled HIV or AIDS  Are obese (body mass index of 40 or higher)  Smoke regularly   o Caregivers should wear gloves while washing dishes, handling laundry and cleaning bedrooms and bathrooms.  o Use caution when washing and drying laundry: Don't shake dirty laundry, and use the warmest water setting that you can.  o For more tips, go to www.cdc.gov/coronavirus/2019-ncov/downloads/10Things.pdf.    4.Sign up for [employer] [facility]. We know it's scary to hear that you might have COVID-19. We want to track your symptoms to make sure you're okay over " the next 2 weeks. Please look for an email from [employer]---this is a free, online program that we'll use to keep in touch. To sign up, follow the link in the email. Learn more at [email or web address]  How can I take care of myself?   Get lots of rest. Drink extra fluids (unless a doctor has told you not to).   Take Tylenol (acetaminophen) for fever or pain. If you have liver or kidney problems, ask your family doctor if it's okay to take Tylenol.   Adults can take either:    650 mg (two 325 mg pills) every 4 to 6 hours, or...   1,000 mg (two 500 mg pills) every 8 hours as needed.    Note: Don't take more than 3,000 mg in one day. Acetaminophen is found in many medicines (both prescribed and over-the-counter medicines). Read all labels to be sure you don't take too much.   For children, check the Tylenol bottle for the right dose. The dose is based on the child's age or weight.    If you have other health problems (like cancer, heart failure, an organ transplant or severe kidney disease): Call your specialty clinic if you don't feel better in the next 2 days.       Know when to call 911. Emergency warning signs include:    Trouble breathing or shortness of breath Pain or pressure in the chest that doesn't go away Feeling confused like you haven't felt before, or not being able to wake up Bluish-colored lips or face.  Where can I get more information?   [facility] -- About COVID-19: [email or web address]   CDC -- What to Do If You're Sick: www.cdc.gov/coronavirus/2019-ncov/about/steps-when-sick.html   CDC -- Ending Home Isolation: www.cdc.gov/coronavirus/2019-ncov/hcp/disposition-in-home-patients.html   CDC -- Caring for Someone: www.cdc.gov/coronavirus/2019-ncov/if-you-are-sick/care-for-someone.html   [facility] -- Interim Guidance for Hospital Discharge to Home: www.health.[facility].mn./diseases/coronavirus/hcp/hospdischarge.pdf   [facility] clinical trials (COVID-19 research  studies): clinicalaffairs.[facility]-clinical-trials    Below are the COVID-19 hotlines at the [place] Department of Health ([facility]). Interpreters are available.    For health questions: Call 673-388-2273 or 1-582.297.5936 (7 a.m. to 7 p.m.) For questions about schools and childcare: Call 002-898-4954 or 1-856.142.7737 (7 a.m. to 7 p.m.)       [facility] Strep Test    I am sorry you are not feeling well. Based on your lab results, you do not have strep throat. This means your symptoms are caused by a virus and not the bacteria that causes strep throat. Antibiotic medications are not effective against a viral infection and will not help symptoms improve or make the condition less contagious.  The following tips will keep you as comfortable as possible while you recover:     Rest    Drink plenty of water and other liquids    Take a hot shower to loosen congestion    Use throat lozenges    Gargle with warm salt water (1/4 teaspoon of salt per 8 ounce glass of water)    Suck on frozen items such as popsicles or ice cubes    Drink hot tea with lemon and honey     Please be seen in a clinic or urgent care if new symptoms develop, or symptoms become worse.  Call 427 or go to the emergency room if you suddenly develop a rash, are drooling or unable to swallow fluids, if you are having difficulty breathing, or feel that your throat is closing off.  Because strep throat can be easily spread to others, proof of evaluation by a provider is often requested before returning to work, school, or . The statement below summarizes my evaluation. Please print a copy of this Visit if written verification is needed.  Williams was evaluated for strep throat and lab testing was completed. As a result, strep throat was ruled out and symptoms are the result of a viral infection. Antibiotics were not prescribed because they are not an effective treatment for viral infections and will not make it less contagious. Williams can return to  work, school, or  if he has not had a fever for 24 hours without the use of a fever-reducing medication and feels well enough for daily activities.   Diagnosis: Pain in throat  Diagnosis ICD: J02.9  [facility] Results: SSCRNT: NEGATIVE: No Group A streptococcal antigen detected by immunoassay, await  culture report.  [employer] Secondary Results: CULT: No Beta Streptococcus isolated

## 2020-07-11 ENCOUNTER — VIRTUAL VISIT (OUTPATIENT)
Dept: LAB | Facility: CLINIC | Age: 45
End: 2020-07-11
Payer: COMMERCIAL

## 2020-07-11 DIAGNOSIS — R07.0 THROAT PAIN: Primary | ICD-10-CM

## 2020-07-12 LAB
BACTERIA SPEC CULT: NORMAL
SPECIMEN SOURCE: NORMAL

## 2020-07-15 DIAGNOSIS — Z11.59 SCREENING FOR VIRAL DISEASE: ICD-10-CM

## 2020-07-15 PROCEDURE — U0003 INFECTIOUS AGENT DETECTION BY NUCLEIC ACID (DNA OR RNA); SEVERE ACUTE RESPIRATORY SYNDROME CORONAVIRUS 2 (SARS-COV-2) (CORONAVIRUS DISEASE [COVID-19]), AMPLIFIED PROBE TECHNIQUE, MAKING USE OF HIGH THROUGHPUT TECHNOLOGIES AS DESCRIBED BY CMS-2020-01-R: HCPCS | Performed by: FAMILY MEDICINE

## 2020-07-15 NOTE — LETTER
July 20, 2020        Williams Hemphill  [address] [place] [address]    This letter provides a written record that you were tested for COVID-19 on 07/15/20.       Your result was negative. This means that we didn t find the virus that causes COVID-19 in your sample. A test may show negative when you do actually have the virus. This can happen when the virus is in the early stages of infection, before you feel illness symptoms.    If you have symptoms   Stay home and away from others (self-isolate) until you meet ALL of the guidelines below:    You ve had no fever--and no medicine that reduces fever--for 3 full days (72 hours). And      Your other symptoms have gotten better. For example, your cough or breathing has improved. And     At least 10 days have passed since your symptoms started.    During this time:    Stay home. Don t go to work, school or anywhere else.     Stay in your own room, including for meals. Use your own bathroom if you can.    Stay away from others in your home. No hugging, kissing or shaking hands. No visitors.    Clean  high touch  surfaces often (doorknobs, counters, handles, etc.). Use a household cleaning spray or wipes. You can find a full list on the EPA website at www.epa.gov/pesticide-registration/list-n-disinfectants-use-against-sars-cov-2.    Cover your mouth and nose with a mask, tissue or washcloth to avoid spreading germs.    Wash your hands and face often with soap and water.    Going back to work  Check with your employer for any guidelines to follow for going back to work.    Employers: This document serves as formal notice that your employee tested negative for COVID-19, as of the testing date shown above.

## 2020-07-16 LAB
SARS-COV-2 RNA SPEC QL NAA+PROBE: NOT DETECTED
SPECIMEN SOURCE: NORMAL

## 2020-07-27 ENCOUNTER — OFFICE VISIT (OUTPATIENT)
Dept: FAMILY MEDICINE | Facility: CLINIC | Age: 45
End: 2020-07-27
Payer: COMMERCIAL

## 2020-07-27 VITALS
RESPIRATION RATE: 20 BRPM | WEIGHT: 236 LBS | HEART RATE: 86 BPM | TEMPERATURE: 97.5 F | OXYGEN SATURATION: 98 % | HEIGHT: 71 IN | DIASTOLIC BLOOD PRESSURE: 92 MMHG | SYSTOLIC BLOOD PRESSURE: 138 MMHG | BODY MASS INDEX: 33.04 KG/M2

## 2020-07-27 DIAGNOSIS — J06.9 VIRAL URI WITH COUGH: Primary | ICD-10-CM

## 2020-07-27 PROCEDURE — 99213 OFFICE O/P EST LOW 20 MIN: CPT | Performed by: FAMILY MEDICINE

## 2020-07-27 ASSESSMENT — MIFFLIN-ST. JEOR: SCORE: 1969.68

## 2020-07-27 NOTE — LETTER
[facility]  [address] [address]  Phone: [phone]    July 27, 2020      Williams Hemphill  [address] [place] [id number]      Dear Mr. Hemphill    You were seen in clinic today for a respiratory infection. You are not contagious.   You may return to work without restrictions on 7-28-20.     Sincerely,    / Juan Qiu MD
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
## 2020-07-27 NOTE — PATIENT INSTRUCTIONS
Thank you for choosing [facility].  You may be receiving an email and/or telephone survey request from [facility] Customer Experience regarding your visit today.  Please take a few minutes to respond to the survey to let us know how we are doing.      If you have questions or concerns, please contact us via [facility] or you can contact your care team at [phone].    Our Clinic hours are:  Monday 6:40 am  to 7:00 pm  Tuesday -Friday 6:40 am to 5:00 pm    The [place] outpatient lab hours are:  Monday - Friday 6:10 am to 4:45 pm  Saturdays 7:00 am to 11:00 am  Appointments are required, call [phone]    If you have clinical questions after hours or would like to schedule an appointment,  call the clinic at [phone].    (J06.9) Viral URI with cough  (primary encounter diagnosis)  Comment:   Plan: we discussed the infection and this was likely a virus and you are recovering. You are not contagious.   Gradually increase activities, and stay well hydrated. Use Tylenol and the Robitussin DM for the cough.   Follow up as needed. See the work letter.

## 2020-08-14 ENCOUNTER — VIRTUAL VISIT (OUTPATIENT)
Dept: FAMILY MEDICINE | Facility: OTHER | Age: 45
End: 2020-08-14

## 2020-08-14 NOTE — PROGRESS NOTES
"Date: 2020 16:14:18  Clinician: Gerda Servin  Clinician NPI: [id number]  Patient: Williams Hemphill  Patient : 1975  Patient Address: [address] [place] [address]  Patient Phone: [phone]  Visit Protocol: URI  Patient Summary:  Williams is a 44 year old ( : 1975 ) male who initiated a Visit for COVID-19 (Coronavirus) evaluation and screening. When asked the question \"Please sign me up to receive news, health information and promotions. \", Williams responded \"Yes\".    Williams states his symptoms started today.   His symptoms consist of a headache, a sore throat, a cough, nasal congestion, and malaise.   Symptom details     Nasal secretions: The color of his mucus is white.    Cough: Williams coughs a few times an hour and his cough is not more bothersome at night. Phlegm does not come into his throat when he coughs. He does not believe his cough is caused by post-nasal drip.     Sore throat: Williams reports having mild throat pain (1-3 on a 10 point pain scale), does not have exudate on his tonsils, and can swallow liquids. He is not sure if the lymph nodes in his neck are enlarged. A rash has not appeared on the skin since the sore throat started.     Headache: He states the headache is mild (1-3 on a 10 point pain scale).      Williams denies having ear pain, chills, nausea, teeth pain, ageusia, diarrhea, vomiting, rhinitis, myalgias, anosmia, facial pain or pressure, fever, and wheezing. He also denies taking antibiotic medication in the past month and having recent facial or sinus surgery in the past 60 days. He is not experiencing dyspnea.   Precipitating events  Williams is not sure if he has been exposed to someone with strep throat. He has not recently been exposed to someone with influenza. Williams has not been in close contact with any high risk individuals.   Pertinent COVID-19 (Coronavirus) information  In the past 14 days, Williams has not worked in a congregate living setting.   " He does not work or volunteer as healthcare worker or a  and does not work or volunteer in a healthcare facility.   Williams also has not lived in a congregate living setting in the past 14 days. He does not live with a healthcare worker.   Williams has had a close contact with a laboratory-confirmed COVID-19 patient within 14 days of symptom onset.   Since December 2019, Williams and has had upper respiratory infection (URI) or influenza-like illness. Has not been diagnosed with lab-confirmed COVID-19 test      Date(s) of previous URI or influenza-like illness (free-text): 7-10-20     Symptoms Williams experienced during previous URI or influenza-like illness as reported by the patient (free-text): upper respitory        Pertinent medical history  Williams needs a return to work/school note.   Weight: 225 lbs   Williams does not smoke or use smokeless tobacco.   Weight: 225 lbs  Reason for repeat visit for the same protocol within 24 hours:  I didn't finish the last visit it kicked me off and would let me restart so I am restarting anew visit  See the History of referred by protocol and completed visits section for details on previous visits (visits currently in queue to be diagnosed will not appear in this section).    MEDICATIONS: No current medications, ALLERGIES: NKDA  Clinician Response:  Dear Williams,   Your symptoms show that you may have coronavirus (COVID-19). This illness can cause fever, cough and trouble breathing. Many people get a mild case and get better on their own. Some people can get very sick.  What should I do?  We would like to test you for this virus.   1. Please call [phone] to schedule your visit. Explain that you were [facility] to have a COVID-19 test. Be ready to share your [facility] visit ID number.  The following will serve as your written order for this COVID Test, ordered by me, for the indication of suspected COVID [Z20.828]: The test will be ordered in [facility], our  "electronic health record, after you are scheduled. It will show as ordered and authorized by Umair Oquendo MD.  Order: COVID-19 (Coronavirus) PCR for SYMPTOMATIC testing from [facility].      2. When it's time for your COVID test:  Stay at least 6 feet away from others. (If someone will drive you to your test, stay in the backseat, as far away from the  as you can.)   Cover your mouth and nose with a mask, tissue or washcloth.  Go straight to the testing site. Don't make any stops on the way there or back.      3.Starting now: Stay home and away from others (self-isolate) until:   You've had no fever---and no medicine that reduces fever---for one full day (24 hours). And...   Your other symptoms have gotten better. For example, your cough or breathing has improved. And...   At least 10 days have passed since your symptoms started.       During this time, don't leave the house except for testing or medical care.   Stay in your own room, even for meals. Use your own bathroom if you can.   Stay away from others in your home. No hugging, kissing or shaking hands. No visitors.  Don't go to work, school or anywhere else.    Clean \"high touch\" surfaces often (doorknobs, counters, handles, etc.). Use a household cleaning spray or wipes. You'll find a full list of  on the EPA website: www.epa.gov/pesticide-registration/list-n-disinfectants-use-against-sars-cov-2.   Cover your mouth and nose with a mask, tissue or washcloth to avoid spreading germs.  Wash your hands and face often. Use soap and water.  Caregivers in these groups are at risk for severe illness due to COVID-19:  o People 65 years and older  o People who live in a nursing home or long-term care facility  o People with chronic disease (lung, heart, cancer, diabetes, kidney, liver, immunologic)  o People who have a weakened immune system, including those who:   Are in cancer treatment  Take medicine that weakens the immune system, such as corticosteroids  " Had a bone marrow or organ transplant  Have an immune deficiency  Have poorly controlled HIV or AIDS  Are obese (body mass index of 40 or higher)  Smoke regularly   o Caregivers should wear gloves while washing dishes, handling laundry and cleaning bedrooms and bathrooms.  o Use caution when washing and drying laundry: Don't shake dirty laundry, and use the warmest water setting that you can.  o For more tips, go to www.cdc.gov/coronavirus/2019-ncov/downloads/10Things.pdf.    4.Sign up for [employer]. We know it's scary to hear that you might have COVID-19. We want to track your symptoms to make sure you're okay over the next 2 weeks. Please look for an email from [employer]---this is a free, online program that we'll use to keep in touch. To sign up, follow the link in the email. Learn more at [email or web address]  How can I take care of myself?   Get lots of rest. Drink extra fluids (unless a doctor has told you not to).   Take Tylenol (acetaminophen) for fever or pain. If you have liver or kidney problems, ask your family doctor if it's okay to take Tylenol.   Adults can take either:    650 mg (two 325 mg pills) every 4 to 6 hours, or...   1,000 mg (two 500 mg pills) every 8 hours as needed.    Note: Don't take more than 3,000 mg in one day. Acetaminophen is found in many medicines (both prescribed and over-the-counter medicines). Read all labels to be sure you don't take too much.   For children, check the Tylenol bottle for the right dose. The dose is based on the child's age or weight.    If you have other health problems (like cancer, heart failure, an organ transplant or severe kidney disease): Call your specialty clinic if you don't feel better in the next 2 days.       Know when to call 911. Emergency warning signs include:    Trouble breathing or shortness of breath Pain or pressure in the chest that doesn't go away Feeling confused like you haven't felt before, or not being able to wake  up Bluish-colored lips or face.  Where can I get more information?   [facility] -- About COVID-19: [email or web address]   CDC -- What to Do If You're Sick: www.cdc.gov/coronavirus/2019-ncov/about/steps-when-sick.html   [facility] -- Ending Home Isolation: www.cdc.gov/coronavirus/2019-ncov/hcp/disposition-in-home-patients.html   [facility] -- Caring for Someone: www.cdc.gov/coronavirus/2019-ncov/if-you-are-sick/care-for-someone.html   [facility] -- Interim Guidance for Hospital Discharge to Home: www.[facility].mn.[place]/diseases/coronavirus/hcp/hospdischarge.pdf   [facility] clinical trials (COVID-19 research studies): clinicalaffairs.[facility]-clinical-trials    Below are the COVID-19 hotlines at the [place] [facility] of [facility] ([facility]). Interpreters are available.    For health questions: Call 163-178-4322 or 1-500.552.9569 (7 a.m. to 7 p.m.) For questions about schools and childcare: Call 636-859-0581 or 1-816.701.3642 (7 a.m. to 7 p.m.)    Diagnosis: Cough  Diagnosis ICD: R05

## 2020-08-17 ENCOUNTER — AMBULATORY - HEALTHEAST (OUTPATIENT)
Dept: INTERNAL MEDICINE | Facility: CLINIC | Age: 45
End: 2020-08-17

## 2020-08-17 DIAGNOSIS — Z20.822 SUSPECTED 2019 NOVEL CORONAVIRUS INFECTION: ICD-10-CM

## 2020-08-18 ENCOUNTER — AMBULATORY - HEALTHEAST (OUTPATIENT)
Dept: FAMILY MEDICINE | Facility: CLINIC | Age: 45
End: 2020-08-18

## 2020-08-18 DIAGNOSIS — Z20.822 SUSPECTED 2019 NOVEL CORONAVIRUS INFECTION: ICD-10-CM

## 2020-08-21 ENCOUNTER — COMMUNICATION - HEALTHEAST (OUTPATIENT)
Dept: SCHEDULING | Facility: CLINIC | Age: 45
End: 2020-08-21

## 2020-08-21 ENCOUNTER — NURSE TRIAGE (OUTPATIENT)
Dept: NURSING | Facility: CLINIC | Age: 45
End: 2020-08-21

## 2020-08-21 NOTE — TELEPHONE ENCOUNTER
Coronavirus (COVID-19) Notification    Lab Result   Lab test 2019-nCoV rRt-PCR OR SARS-COV-2 PCR    Nasopharyngeal AND/OR Oropharyngeal swab is NEGATIVE for 2019-nCoV RNA [OR] SARS-COV-2 RNA (COVID-19) RNA    Your result was negative. This means that we didn't find the virus that causes COVID-19 in your sample. A test may show negative when you do actually have the virus. This can happen when the virus is in the early stages of infection, before you feel illness symptoms.    If you have symptoms   Stay home and away from others (self-isolate) until you meet ALL of the guidelines below:    You've had no fever--and no medicine that reduces fever--for 3 full days (72 hours). And      Your other symptoms have gotten better. For example, your cough or breathing has improved. And     At least 10 days have passed since your symptoms started.    During this time:    Stay home. Don't go to work, school or anywhere else.     Stay in your own room, including for meals. Use your own bathroom if you can.    Stay away from others in your home. No hugging, kissing or shaking hands. No visitors.    Clean  high touch  surfaces often (doorknobs, counters, handles, etc.). Use a household cleaning spray or wipes. You can find a full list on the EPA website at www.epa.gov/pesticide-registration/list-n-disinfectants-use-against-sars-cov-2.    Cover your mouth and nose with a mask, tissue or washcloth to avoid spreading germs.    Wash your hands and face often with soap and water.    Going back to work  Check with your employer for any guidelines to follow for going back to work.  You are sent a letter for your Employer which will serve as formal document notice that you, the employee, tested negative for COVID-19, as of the testing date shown above.    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    Where can I get more information?    [facility]:  [email or web address]    Coronavirus Basics: www.health.[facility]./diseases/coronavirus/basics.html    [facility] Hotline (696-014-9081)    {Name]  America Us RN  [facility]

## 2021-05-26 ENCOUNTER — RECORDS - HEALTHEAST (OUTPATIENT)
Dept: ADMINISTRATIVE | Facility: CLINIC | Age: 46
End: 2021-05-26

## 2021-06-09 NOTE — TELEPHONE ENCOUNTER
Has had contact with a covid positive person, and now woke up today with a fever and terrible headache as well as sore throat.  Would like to be tested for Covid, but does not have a PCP, and has not been seen in clinic for 3 years.  Advised [email or web address].  Advised to call back with further questions or worsening of symptoms.            COVID 19 Nurse Triage Plan/Patient Instructions    Please be aware that novel coronavirus (COVID-19) may be circulating in the community. If you develop symptoms such as fever, cough, or SOB or if you have concerns about the presence of another infection including coronavirus (COVID-19), please contact your health care provider or visit [email or web address].     Disposition/Instructions    Patient to schedule a Virtual Visit with provider. Reference Visit Selection Guide.    Thank you for taking steps to prevent the spread of this virus.  o Limit your contact with others.  o Wear a simple mask to cover your cough.  o Wash your hands well and often.    Resources    [facility]: About COVID-19: [email or web address]    CDC: What to Do If You're Sick: www.cdc.gov/coronavirus/2019-ncov/about/steps-when-sick.html    CDC: Ending Home Isolation: www.cdc.gov/coronavirus/2019-ncov/hcp/disposition-in-home-patients.html     CDC: Caring for Someone: www.cdc.gov/coronavirus/2019-ncov/if-you-are-sick/care-for-someone.html     [facility]: Interim Guidance for Hospital Discharge to Home: www.health.[facility].mn.us/diseases/coronavirus/hcp/hospdischarge.pdf    [facility] clinical trials (COVID-19 research studies): clinicalaffairs.[facility]/umn-clinical-trials     Below are the COVID-19 hotlines at the [facility] ([facility]). Interpreters are available.   o For health questions: Call 273-295-8624 or 1-298.127.4378 (7 a.m. to 7 p.m.)  o For questions about schools and childcare: Call 231-433-1360 or 1-370.697.1704 (7 a.m. to 7 p.m.)       Reason for Disposition    [1] COVID-19  infection suspected by caller or triager AND [2] mild symptoms (cough, fever, or others) AND [3] no complications or SOB    Protocols used: CORONAVIRUS (COVID-19) DIAGNOSED OR [id number] 5.16.20

## 2021-06-10 NOTE — TELEPHONE ENCOUNTER
Coronavirus (COVID-19) Notification    Reason for call  Notify of POSITIVE  COVID-19 lab result, assess symptoms,  review [facility] recommendations    Lab Result   Lab test for 2019-nCoV rRt-PCR or SARS-COV-2 PCR  Oropharyngeal AND/OR nasopharyngeal swabs were POSITIVE for 2019-nCoV RNA [OR] SARS-COV-2 RNA (COVID-19) RNA     We have been unable to reach Patient by phone at this time to notify of their Positive COVID-19 result.  Left voicemail message requesting a call back to [phone] [facility] for results.        POSITIVE COVID-19 Letter sent.    [Name]  Shaun Saab RN  "[employer]  COVID19 Results Team RN  Ph# [phone]

## 2021-08-02 ENCOUNTER — HOSPITAL ENCOUNTER (EMERGENCY)
Facility: CLINIC | Age: 46
Discharge: HOME | End: 2021-08-02
Attending: PSYCHIATRY & NEUROLOGY | Admitting: PSYCHIATRY & NEUROLOGY
Payer: MEDICAID

## 2021-08-02 VITALS
HEART RATE: 79 BPM | DIASTOLIC BLOOD PRESSURE: 76 MMHG | OXYGEN SATURATION: 99 % | TEMPERATURE: 97.8 F | SYSTOLIC BLOOD PRESSURE: 131 MMHG | RESPIRATION RATE: 18 BRPM

## 2021-08-02 DIAGNOSIS — F19.20 CHEMICAL DEPENDENCY (H): ICD-10-CM

## 2021-08-02 DIAGNOSIS — F19.959 PSYCHOACTIVE SUBSTANCE-INDUCED PSYCHOSIS (H): ICD-10-CM

## 2021-08-02 PROCEDURE — 99285 EMERGENCY DEPT VISIT HI MDM: CPT | Mod: 25 | Performed by: PSYCHIATRY & NEUROLOGY

## 2021-08-02 PROCEDURE — 250N000013 HC RX MED GY IP 250 OP 250 PS 637: Performed by: PSYCHIATRY & NEUROLOGY

## 2021-08-02 PROCEDURE — 90791 PSYCH DIAGNOSTIC EVALUATION: CPT

## 2021-08-02 PROCEDURE — 99284 EMERGENCY DEPT VISIT MOD MDM: CPT | Performed by: PSYCHIATRY & NEUROLOGY

## 2021-08-02 RX ORDER — OLANZAPINE 5 MG/1
5 TABLET ORAL AT BEDTIME
Qty: 10 TABLET | Refills: 0 | Status: ON HOLD | OUTPATIENT
Start: 2021-08-02 | End: 2021-08-06

## 2021-08-02 RX ORDER — OLANZAPINE 5 MG/1
5 TABLET, ORALLY DISINTEGRATING ORAL ONCE
Status: COMPLETED | OUTPATIENT
Start: 2021-08-02 | End: 2021-08-02

## 2021-08-02 RX ORDER — LISINOPRIL 10 MG/1
10 TABLET ORAL DAILY
Status: ON HOLD | COMMUNITY
End: 2021-08-06

## 2021-08-02 RX ADMIN — OLANZAPINE 5 MG: 5 TABLET, ORALLY DISINTEGRATING ORAL at 18:49

## 2021-08-02 ASSESSMENT — ENCOUNTER SYMPTOMS
DECREASED CONCENTRATION: 1
GASTROINTESTINAL NEGATIVE: 1
NERVOUS/ANXIOUS: 1
SLEEP DISTURBANCE: 1
CARDIOVASCULAR NEGATIVE: 1
NEUROLOGICAL NEGATIVE: 1
RESPIRATORY NEGATIVE: 1
EYES NEGATIVE: 1
MUSCULOSKELETAL NEGATIVE: 1
HALLUCINATIONS: 1
CONSTITUTIONAL NEGATIVE: 1

## 2021-08-02 NOTE — ED NOTES
"8/2/2021  Williams Hemphill 1975     [facility] Crisis Assessment:    Started at: 5:59 PM  Completed at: 6:17 PM  Patient was assessed via virtually ([employer] cart or other teleconferencing device).    Chief Complaint and History of Presenting Problem:  Patient is a 45 year old  male who presented to the ED by Medics related to concerns for depression, paranoia and suicidal ideation.    Pt reports he was at the ED in [place], [place], on 8/1/2021 and was given Zyprexa and discharged. Pt reports marital strain and recently has been using meth and cocaine again on occasion. Reports an incident recently with his wife and children regarding a security camera video he believes they may have altered to \"make me feel like I am crazy\". He reports he was going to go camping to \"get away and clear my head\". Reports he could not reach his wife and kids this morning, called his mom and told her \"if one of them does not answer I am going to blow my head off\". She called police and pt was brought by ambulance to ED.         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 He reports since December of 2020 experiencing marital strain with his wife. Reports 17 years he got sober from all drugs and alcohol. Reports last 5 years he has been on suboxone. Reports a few months ago being increasingly depressed turned to drinking which led him to using cocaine and meth on occasion.     Mental Health History and Current Symptoms   Patient identifies historical diagnoses of MDD and JANNIE. At baseline, patient describes their mental health symptoms as manageable on day to day basis. " Currently pt reports anxiety, depression, anger/frustration and grief/loss. Pt reports using on occasion and reports desire to taper off suboxone maintenance and be sober.     Mental Health History (prior psychiatric hospitalizations, civil commitments, programmatic care, etc): Pt reports residential and outpatient treatment for addiction 16-17 years ago.   Family Mental and Chemical Health History: uknown; did not assess    Current and Historic Psychotropic Medications: Pt repoerts currently taking suboxone  Medication Adherent: Yes and pt reports a few months ago stopping suboxone and lexparo   Recent medication changes? No    Current Providers  Primary Care Provider: Yes, Dr. Harris  Psychiatrist: No  Therapist: No  : No  ARMHS: No  ACT Team: No  Other: No    Has an WILFRED been signed? No       Relevant Medical Concerns  Patient identifies concerns with completing ADLs? No  Patient can ambulate independently? Yes  Other medical health concerns? No  History of concussion or TBI? No     Trauma History   Physical, Emotional, or Sexual abuse: No  Loss of a friend or family member to suicide: No  Other identified traumatic event or significant stressor: Yes    Current Symptoms  Attention, Hyperactivity, and Impulsivity: No   Anxiety:Yes: Panic attacks and Generalized Symptoms: Avoidance, Cognitive anxiety - feelings of doom, racing thoughts, difficulty concentrating , Excessive worry and Physiological anxiety - sweating, flushing, shaking, shortness of breath, or racing heart    Behavioral Difficulties: Yes: Agitation, Anger Problems and Impulsivity/Disinhibition   Mood Symptoms: Yes: Appetite change/weight change , Feelings of helplessness , Feelings of hopelessness , Feelings of worthlessness , Impaired decision making , Increased irritability/agitation, Low self esteem , Risky behaviors, Sad, depressed mood  and Sleep disturbance    Appetite: Yes: Loss of Appetite   Feeding and Eating: No  Interpersonal  Functioning: Yes: Impaired Impulse Control and Impaired Interpersonal Functioning  Learning Disabilities/Cognitive/Developmental Disorders: No   General Cognitive Impairments: No  If yes, see completed Mini-Cog Assessment below.  Sleep: Yes: Difficulty falling asleep  and Difficulty staying sleep    Psychosis: No    Trauma: No     Substance Use History and Treatments  Pt reports 17 years ago history of residential CD treatment and was attending 12 step meetings. History of alcohol use, cocaine use and meth use. Last use of cocaine 7/29/21 and last use of meth reported 8/1/2021    Patient has recently completed a drug screen or BAL/Breathalyzer? No    History of Suicidal Ideation, Suicide Attempts, and Risk Formulation:   Patient does  have a history of suicidal ideation beginning December of 2020. Patient is able to identify triggers to suicidal ideation which include marital strain, anger, sadness and frustration. Patient does not acknowledge a history of suicide attempts. Patient does not have a history of self-injurious behavior.     ESS-6  1.a. Over the past 2 weeks, have you had thoughts of killing yourself? Yes   1.b. Have you ever attempted to kill yourself and, if yes, when did this last happen? No  2. Recent or current suicide plan? No  3. Recent or current intent to act on ideation? No  4. Lifetime psychiatric hospitalization? Yes  5. Pattern of excessive substance use? Yes  6. Current irritability, agitation, or aggression? No  ESS-6 Score: 3    Patient identifies current suicidal ideation which includes making statement to his mother earier today. He denies any SI leading up to this morning and denies any active SI in ED, pt reports he is desperate and emotional and that is why he made the statement to his mother. Patient reports onset of ideation was December of 2020, frequency is sometimes,  duration is fleeting, and intensity is mild. Patient does not have an identified plan for suicide. Patient  denies any intent to self harm or act on suicidal thoughts.    Current risk factors for suicide include loss of relationship, separation/divorce, etc., poor interpersonal relationships, helplessness/hopelessness and rage, anger, seeking revenge. Protective factors against suicide include strong bond to family/friends, community support, employment, responsibilities to others (spouse, pets, children, etc.) and future oriented towards goals, hopes, dreams.    Other Risk Areas  Aggressive/assumptive/homicidal risk factors: No   Duty to warn?No   Was a Child Protection Report Made? No   Was a Adult Protection Report Made? No      Sexually inappropriate behavior? No      Vulnerability to sexual exploitation? No     Mental Status Exam:  Affect: Appropriate  Appearance: Appropriate   Attention Span/Concentration: Attentive    Eye Contact: Variable  Fund of Knowledge: Appropriate   Language /Speech Content: Fluent  Language /Speech Volume: Normal   Language /Speech Rate/Productions: Normal   Recent Memory: Intact  Remote Memory: Intact  Mood: Anxious   Orientation:   Person: Yes   Place: Yes  Time of Day: Yes   Date: Yes   Situation (Do they understand why they are here?): Yes   Psychomotor Behavior: Normal   Thought Content: Clear  Thought Form: Intact    Assessments and Screeners  Mini-Cog Assessment: Remove if not applicable (complete as standard for individuals 65+ )  N/A    Clinical Summary and Disposition  Clinical summary (include strengths, protective factors, community resources, and assessment of vulnerability/risk):   Pt is 45 year old male with history of JANNIE And MDD. Pt was alert and oriented during assessment. Pt denied HI, SIB And hallucinations. Pt reported some recent use of meth and cocaine. Pt reported issues with sleep and appetite. Pt reported symptoms of panic attacks, anxiety and depression. Pt reports making suicidla statemnt to family member today when distressed. Pt denies any other recent or  active SI. Pt has PCP who manages medications. No therapy providers currently. Pt reports significant strain with wife, children and family at this time. Pt is recommended for outpatient LOC; CD assessment/treatment and medication management.         Diagnosis:              Major depressive disorder, Recurrent episode, Moderate F33.1       Generalized anxiety disorder F41.1           Disposition:  Attending provider, Dr.Dung Marie, consulted and does  agree with recommended disposition which includes Medication Management and Substance Abuse Disorder Treatment. Patient agrees with recommended level of care. Dicussed outpatient treatment and psychiatry with pt.     Details of final disposition include: Medication management and Substance abuse disorder treatment . Pt will be contacted by [facility] to schedule intakes for recommended services. Pt reports he has an appointment   8/9/2021 with his PCP.    If Inpatient, is patient admitted voluntary? N/A   Patient aware of potential for transfer if there is not appropriate placement? NA  Patient is willing to travel outside of the [facility] for placement? NA   Central Intake Notified? NA    Safety and After Care Planning:        Safety Plan Provided?  Yes    Follow-Up Plans and Providers: 8/9/21 apt with PCP; Dr. Harris    Follow-Up Plan:  After care plan provided to the patient/guardian by: ED Staff  After care plan provided to any additional sources/parties? No    Duration of face to face time with patient in minutes: .50 hrs    CPT code(s) utilized: 40081      CARLITO Barba

## 2021-08-02 NOTE — ED NOTES
If I am feeling unsafe or I am in a crisis, I will:   Utilize my safety plan  Contact my established care providers   Call the National Suicide Prevention Lifeline: [phone]   Go to the nearest emergency room   Call 911          Warning signs that I or other people might notice when a crisis is developing for me:   -increase in anxiety  - difficulty managing day to day responsibilities  - suicidal thoughts    Things I am able to do on my own to cope or help me feel better:   - remove myself from stressful situations  - share thoughts and feelings with a trusted individual    Things that I am able to do with others to cope or help me better:   - spend time with others   - ask for help     Things I can use or do for distraction:   - remove myself physically from situations  - work      Changes I can make to support my mental health and wellness:   - follow through with outpatient treatment to address mental health and addiction needs    People in my life that I can ask for help:   -primary care doctor  - crisis workers  - trusted family members      Your [facility] has a mental health crisis team you can call 24/7:   [facility]  The Behavioral Health Emergency Services or Crisis Program provides services to individuals 24 hours a day, 7 days a week through a contract with [facility] by dialing 911 and asking for the on call Behavioral Health worker. These services are offered in homes, police departments, hospitals, schools and other community locations.    Other things that are important when I m in crisis:   - keeping myself physically staff  - reaching out for help as needed           Additional resources and information:   1- follow through with all outpatient providers    **2- [facility] will contact you to help schedule intake for outpatient treatment and psychiatry as discussed. You can also reach [facility] at #[phone]
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
## 2021-08-02 NOTE — ED NOTES
Bed: HW05  Expected date: 8/2/21  Expected time: 4:00 PM  Means of arrival: Ambulance  Comments:  [facility] with 44 yo Male Suicidal and cooperative.  He GARCIA

## 2021-08-02 NOTE — DISCHARGE INSTRUCTIONS
----- Message from Neel Tillman sent at 3/27/2020  8:50 AM CDT -----  Contact: Wife  Pt went to the ER last night and was told to f/u with an orthopedic surgeon. Please contact the wife back to discuss.    Contact Info    Take prescribed olanzapine as prescribed to manage anxiety and fears. Please follow-up with established care provider for ongoing med monitoring and management and refills  Follow-up referred [facility]  and Adult MI/CD Day Treatment to work on controlling your recent substance abuse with negative effects of paranoia and anxiety leading to multiple ED visits

## 2021-08-04 ENCOUNTER — HOSPITAL ENCOUNTER (INPATIENT)
Facility: CLINIC | Age: 46
End: 2021-08-04
Admitting: PSYCHIATRY & NEUROLOGY

## 2021-08-04 ENCOUNTER — HOSPITAL ENCOUNTER (INPATIENT)
Facility: CLINIC | Age: 46
LOS: 1 days | Discharge: HOME | End: 2021-08-06
Attending: FAMILY MEDICINE | Admitting: PSYCHIATRY & NEUROLOGY
Payer: MEDICAID

## 2021-08-04 ENCOUNTER — TELEPHONE (OUTPATIENT)
Dept: BEHAVIORAL HEALTH | Facility: CLINIC | Age: 46
End: 2021-08-04

## 2021-08-04 DIAGNOSIS — F15.259 OTH STIMULANT DEPEND W STIM-INDUCE PSYCHOTIC DISORDER, UNSP (H): ICD-10-CM

## 2021-08-04 DIAGNOSIS — I10 BENIGN ESSENTIAL HYPERTENSION: Primary | ICD-10-CM

## 2021-08-04 DIAGNOSIS — R45.851 SUICIDAL IDEATION: ICD-10-CM

## 2021-08-04 DIAGNOSIS — F19.10 POLYSUBSTANCE ABUSE (H): ICD-10-CM

## 2021-08-04 DIAGNOSIS — Z11.52 ENCOUNTER FOR SCREENING LABORATORY TESTING FOR SEVERE ACUTE RESPIRATORY SYNDROME CORONAVIRUS 2 (SARS-COV-2): ICD-10-CM

## 2021-08-04 DIAGNOSIS — F15.959 METHAMPHETAMINE-INDUCED PSYCHOTIC DISORDER (H): ICD-10-CM

## 2021-08-04 DIAGNOSIS — F31.81 BIPOLAR 2 DISORDER (H): ICD-10-CM

## 2021-08-04 LAB
ALCOHOL BREATH TEST: 0 (ref 0–0.01)
AMPHETAMINES UR QL SCN: ABNORMAL
APAP SERPL-MCNC: <2 MG/L (ref 10–30)
BARBITURATES UR QL: ABNORMAL
BENZODIAZ UR QL: ABNORMAL
CANNABINOIDS UR QL SCN: ABNORMAL
COCAINE UR QL: ABNORMAL
HOLD SPECIMEN: NORMAL
HOLD SPECIMEN: NORMAL
OPIATES UR QL SCN: ABNORMAL
SALICYLATES SERPL-MCNC: 2 MG/DL
SARS-COV-2 RNA RESP QL NAA+PROBE: NEGATIVE

## 2021-08-04 PROCEDURE — 99284 EMERGENCY DEPT VISIT MOD MDM: CPT | Performed by: FAMILY MEDICINE

## 2021-08-04 PROCEDURE — 36415 COLL VENOUS BLD VENIPUNCTURE: CPT | Performed by: FAMILY MEDICINE

## 2021-08-04 PROCEDURE — 80053 COMPREHEN METABOLIC PANEL: CPT | Performed by: PSYCHIATRY & NEUROLOGY

## 2021-08-04 PROCEDURE — 99285 EMERGENCY DEPT VISIT HI MDM: CPT | Mod: 25 | Performed by: FAMILY MEDICINE

## 2021-08-04 PROCEDURE — 80307 DRUG TEST PRSMV CHEM ANLYZR: CPT | Performed by: EMERGENCY MEDICINE

## 2021-08-04 PROCEDURE — C9803 HOPD COVID-19 SPEC COLLECT: HCPCS | Performed by: FAMILY MEDICINE

## 2021-08-04 PROCEDURE — 82075 ASSAY OF BREATH ETHANOL: CPT | Performed by: FAMILY MEDICINE

## 2021-08-04 PROCEDURE — 87635 SARS-COV-2 COVID-19 AMP PRB: CPT | Performed by: FAMILY MEDICINE

## 2021-08-04 PROCEDURE — 80179 DRUG ASSAY SALICYLATE: CPT | Performed by: EMERGENCY MEDICINE

## 2021-08-04 PROCEDURE — 250N000013 HC RX MED GY IP 250 OP 250 PS 637: Performed by: FAMILY MEDICINE

## 2021-08-04 PROCEDURE — 250N000013 HC RX MED GY IP 250 OP 250 PS 637: Performed by: EMERGENCY MEDICINE

## 2021-08-04 PROCEDURE — 80143 DRUG ASSAY ACETAMINOPHEN: CPT | Performed by: EMERGENCY MEDICINE

## 2021-08-04 PROCEDURE — 80307 DRUG TEST PRSMV CHEM ANLYZR: CPT | Performed by: FAMILY MEDICINE

## 2021-08-04 PROCEDURE — 90791 PSYCH DIAGNOSTIC EVALUATION: CPT

## 2021-08-04 PROCEDURE — 84443 ASSAY THYROID STIM HORMONE: CPT | Performed by: PSYCHIATRY & NEUROLOGY

## 2021-08-04 RX ORDER — BUPRENORPHINE AND NALOXONE 8; 2 MG/1; MG/1
3 FILM, SOLUBLE BUCCAL; SUBLINGUAL DAILY
Status: DISCONTINUED | OUTPATIENT
Start: 2021-08-05 | End: 2021-08-06 | Stop reason: HOSPADM

## 2021-08-04 RX ORDER — LISINOPRIL 10 MG/1
10 TABLET ORAL DAILY
Status: DISCONTINUED | OUTPATIENT
Start: 2021-08-05 | End: 2021-08-06 | Stop reason: HOSPADM

## 2021-08-04 RX ORDER — PHENOL 1.4 %
10 AEROSOL, SPRAY (ML) MUCOUS MEMBRANE AT BEDTIME
COMMUNITY

## 2021-08-04 RX ADMIN — NICOTINE POLACRILEX 4 MG: 4 GUM, CHEWING BUCCAL at 16:06

## 2021-08-04 RX ADMIN — NICOTINE POLACRILEX 4 MG: 4 GUM, CHEWING BUCCAL at 18:14
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
## 2021-08-04 NOTE — PHARMACY-ADMISSION MEDICATION HISTORY
Addendum 8/5/21    Pharmacist called [facility] to verify Suboxone dose. Per JOSE Vance patient was last seen in monthly clinic on 2/8/21 (prior to last available dosing information in [facility]).     Nicollette McMann, PharmD  [facility] Building: Ascom *[id number]       Admission Medication History Completed by Pharmacy    See [facility] Admission Navigator for allergy information, preferred outpatient pharmacy, prior to admission medications and immunization status.     Medication history sources:  patient, [employer]    Pertinent changes made to PTA medication list:  Added: N/A  Deleted: N/A  Changed: N/A    Additional medication history information:   - Unable to contact [facility] after hours number to verify Suboxone dose. Patient states he to #10 films last night and #3 films this morning and has typically been taking smaller doses to make his supply last longer. He also reports purchasing a supply from a friend. Patient reports his dose is 24 mg daily, which aligns with last [place]  history (Suboxone 8/2 mg SL films #48 for 16 day supply last filled 3/12/21) - [facility] dispenses do not populate in [place] .  - Patient denies taking any additional Rx/OTC medications other than the ones listed below.    Prior to Admission medications    Medication Sig Last Dose Taking? Auth Provider   buprenorphine HCl-naloxone HCl (SUBOXONE) 8-2 MG per film Place 3 Film under the tongue daily  8/4/2021 Yes Reported, Patient   lisinopril (ZESTRIL) 10 MG tablet Take 10 mg by mouth daily 8/4/2021 Yes Reported, Patient   Melatonin 10 MG TABS tablet Take 10 mg by mouth At Bedtime  Yes Unknown, Entered By History   OLANZapine (ZYPREXA) 5 MG tablet Take 1 tablet (5 mg) by mouth At Bedtime NOT YET STARTED Yes Josh Marie MD     Date completed: 08/04/21    Medication history completed by:   Job Abbott PharmD, [facility]  [facility]  Department: Ascom *[id number]
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
## 2021-08-04 NOTE — TELEPHONE ENCOUNTER
S: Pt is a 45 yrs old male in the [facility] ED for psychosis, reports by Pao at 4:13PM.     B:  Pt was in the ER on the 1st, given xyprexa then discharge.  Pt was seen with a us a day later, on the 2nd related to concerns of depression, SI.    Pt came in here today and said he wasn t very honest w/ us.  Pt reports having auditory hallucinations since last month.  Pt reported that when he was discharged last Monday, he went to stay in a motor home because he wanted to know what was happening.  He initially thought his family was doing something (putting camera in the home).  That night he continued to have hallucinations of scratching in the ceiling, whispers, tree branches turning into snakes, the radio talking to him.  The whispers- he hears them in pieces, not quite making them out.  When he tries to listen, he stated that  It keeps getting worst.   Pt made statements that he wanted it all to end and took 10 of 8mg of suboxones in tablets.  Pt woke up the next day feeling woozy, took marijuana to feel better.  Hadn t taken Zyprexa that was prescribed to him from Dr. Marie.  Pt reports he has been on suboxone for 5 years through [place].  Quit cold turkey 6-7 weeks and described that it s been worst since coming off.    Wife left in December.  He relapsed, hx of meth, cocaine, marijuana use.  Has been using meth last used was 4 days ago.  Smokes it, eats, and snorts it.  Cocaine has been on and off.  MIRIAM was 3 days ago.  Daily marijuana use in the last 2 weeks.  MIRIAM was today.  Drank alcohol 3x in the beginning of the yr.  MIRIAM was 3 days ago.  He went to substance tx several years ago.  Continues to endorse SI.  Denies a specific plan but making statements that he wants everything to end.  Stated that he doesn t want to do that to his wife.  SA in his 20s via overdose, cut wrist.  NO HI, SIB.  No outpt providers.      Utox is pos for amphetamine and Cannabis, Breathalyzer is 0.  No symptoms of COVID, Medically  cleared.    Walks and talk independently.       COVID: Negative  UTOX: Pos for amphetamines and Cannabis  Vitals: stable     Calm and cooperative, anxious in ED.  Emotional about his wife and kids.      A: Vol.      R:      7:25PM- Dr. Balderrama accepts for 4500/[facility].  He requested to put suboxone in utox as an add-on.      9:29PM- Dr. Graham called that Pt declined going to [facility].  Pt does not want to pay for an ambulance ride and was placed on a hold.  Per ED MD, they will keep him here for a bed.     4500 charge RN was notified.     Patient cleared and ready for behavioral bed placement: Yes

## 2021-08-04 NOTE — ED TRIAGE NOTES
Hallucinations auditory and visual for appox 1 week; pt admits to etoh, meth (smoke, ingestion), heroin (snort, smoke with meth), suboxone, cocaine addiction; normal usage: 1/4oz meth over 5 days, others, occasional usage.  Last use 3 days ago. Suicidal with plan to ingest 10 suboxones which was attempted Tuesday 8/3/21 approx 0300

## 2021-08-05 PROBLEM — F19.10 POLYSUBSTANCE ABUSE (H): Status: ACTIVE | Noted: 2021-08-05

## 2021-08-05 PROBLEM — R45.851 SUICIDAL IDEATION: Status: ACTIVE | Noted: 2021-08-05

## 2021-08-05 PROBLEM — F15.959 METHAMPHETAMINE-INDUCED PSYCHOTIC DISORDER (H): Status: ACTIVE | Noted: 2021-08-05

## 2021-08-05 LAB
ALBUMIN SERPL-MCNC: 4 G/DL (ref 3.4–5)
ALP SERPL-CCNC: 61 U/L (ref 40–150)
ALT SERPL W P-5'-P-CCNC: 28 U/L (ref 0–70)
ANION GAP SERPL CALCULATED.3IONS-SCNC: 4 MMOL/L (ref 3–14)
AST SERPL W P-5'-P-CCNC: 13 U/L (ref 0–45)
BASOPHILS # BLD AUTO: 0.1 10E3/UL (ref 0–0.2)
BASOPHILS NFR BLD AUTO: 1 %
BILIRUB SERPL-MCNC: 0.5 MG/DL (ref 0.2–1.3)
BUN SERPL-MCNC: 12 MG/DL (ref 7–30)
BUPRENORPHINE UR QL: ABNORMAL
CALCIUM SERPL-MCNC: 9 MG/DL (ref 8.5–10.1)
CHLORIDE BLD-SCNC: 107 MMOL/L (ref 94–109)
CO2 SERPL-SCNC: 29 MMOL/L (ref 20–32)
CREAT SERPL-MCNC: 0.88 MG/DL (ref 0.66–1.25)
DEPRECATED CALCIDIOL+CALCIFEROL SERPL-MC: 29 UG/L (ref 20–75)
EOSINOPHIL # BLD AUTO: 0.2 10E3/UL (ref 0–0.7)
EOSINOPHIL NFR BLD AUTO: 2 %
ERYTHROCYTE [DISTWIDTH] IN BLOOD BY AUTOMATED COUNT: 13 % (ref 10–15)
GFR SERPL CREATININE-BSD FRML MDRD: >90 ML/MIN/1.73M2
GLUCOSE BLD-MCNC: 92 MG/DL (ref 70–99)
HCT VFR BLD AUTO: 47.6 % (ref 40–53)
HGB BLD-MCNC: 15.5 G/DL (ref 13.3–17.7)
IMM GRANULOCYTES # BLD: 0 10E3/UL
IMM GRANULOCYTES NFR BLD: 0 %
LYMPHOCYTES # BLD AUTO: 2.3 10E3/UL (ref 0.8–5.3)
LYMPHOCYTES NFR BLD AUTO: 32 %
MCH RBC QN AUTO: 28.6 PG (ref 26.5–33)
MCHC RBC AUTO-ENTMCNC: 32.6 G/DL (ref 31.5–36.5)
MCV RBC AUTO: 88 FL (ref 78–100)
MONOCYTES # BLD AUTO: 0.6 10E3/UL (ref 0–1.3)
MONOCYTES NFR BLD AUTO: 8 %
NEUTROPHILS # BLD AUTO: 4 10E3/UL (ref 1.6–8.3)
NEUTROPHILS NFR BLD AUTO: 57 %
NRBC # BLD AUTO: 0 10E3/UL
NRBC BLD AUTO-RTO: 0 /100
PLATELET # BLD AUTO: 237 10E3/UL (ref 150–450)
POTASSIUM BLD-SCNC: 4.2 MMOL/L (ref 3.4–5.3)
PROT SERPL-MCNC: 7.3 G/DL (ref 6.8–8.8)
RBC # BLD AUTO: 5.42 10E6/UL (ref 4.4–5.9)
SODIUM SERPL-SCNC: 140 MMOL/L (ref 133–144)
TSH SERPL DL<=0.005 MIU/L-ACNC: 1.74 MU/L (ref 0.4–4)
WBC # BLD AUTO: 7.2 10E3/UL (ref 4–11)

## 2021-08-05 PROCEDURE — 124N000002 HC R&B MH UMMC

## 2021-08-05 PROCEDURE — 85025 COMPLETE CBC W/AUTO DIFF WBC: CPT | Performed by: PSYCHIATRY & NEUROLOGY

## 2021-08-05 PROCEDURE — 99223 1ST HOSP IP/OBS HIGH 75: CPT | Mod: AI | Performed by: PSYCHIATRY & NEUROLOGY

## 2021-08-05 PROCEDURE — 82306 VITAMIN D 25 HYDROXY: CPT | Performed by: PSYCHIATRY & NEUROLOGY

## 2021-08-05 PROCEDURE — 36415 COLL VENOUS BLD VENIPUNCTURE: CPT | Performed by: PSYCHIATRY & NEUROLOGY

## 2021-08-05 PROCEDURE — 250N000013 HC RX MED GY IP 250 OP 250 PS 637: Performed by: EMERGENCY MEDICINE

## 2021-08-05 PROCEDURE — 250N000013 HC RX MED GY IP 250 OP 250 PS 637: Performed by: PSYCHIATRY & NEUROLOGY

## 2021-08-05 RX ORDER — OLANZAPINE 10 MG/1
10 TABLET ORAL 3 TIMES DAILY PRN
Status: DISCONTINUED | OUTPATIENT
Start: 2021-08-05 | End: 2021-08-06 | Stop reason: HOSPADM

## 2021-08-05 RX ORDER — OLANZAPINE 5 MG/1
5 TABLET ORAL AT BEDTIME
Status: DISCONTINUED | OUTPATIENT
Start: 2021-08-05 | End: 2021-08-06 | Stop reason: HOSPADM

## 2021-08-05 RX ORDER — NICOTINE 21 MG/24HR
1 PATCH, TRANSDERMAL 24 HOURS TRANSDERMAL DAILY
Status: DISCONTINUED | OUTPATIENT
Start: 2021-08-05 | End: 2021-08-06 | Stop reason: HOSPADM

## 2021-08-05 RX ORDER — HYDROXYZINE HYDROCHLORIDE 25 MG/1
25 TABLET, FILM COATED ORAL EVERY 4 HOURS PRN
Status: DISCONTINUED | OUTPATIENT
Start: 2021-08-05 | End: 2021-08-06 | Stop reason: HOSPADM

## 2021-08-05 RX ORDER — OLANZAPINE 10 MG/2ML
10 INJECTION, POWDER, FOR SOLUTION INTRAMUSCULAR 3 TIMES DAILY PRN
Status: DISCONTINUED | OUTPATIENT
Start: 2021-08-05 | End: 2021-08-06 | Stop reason: HOSPADM

## 2021-08-05 RX ORDER — MAGNESIUM HYDROXIDE/ALUMINUM HYDROXICE/SIMETHICONE 120; 1200; 1200 MG/30ML; MG/30ML; MG/30ML
30 SUSPENSION ORAL EVERY 4 HOURS PRN
Status: DISCONTINUED | OUTPATIENT
Start: 2021-08-05 | End: 2021-08-06 | Stop reason: HOSPADM

## 2021-08-05 RX ORDER — ACETAMINOPHEN 325 MG/1
650 TABLET ORAL EVERY 4 HOURS PRN
Status: DISCONTINUED | OUTPATIENT
Start: 2021-08-05 | End: 2021-08-06 | Stop reason: HOSPADM

## 2021-08-05 RX ORDER — AMOXICILLIN 250 MG
1 CAPSULE ORAL 2 TIMES DAILY PRN
Status: DISCONTINUED | OUTPATIENT
Start: 2021-08-05 | End: 2021-08-06 | Stop reason: HOSPADM

## 2021-08-05 RX ADMIN — NICOTINE POLACRILEX 4 MG: 4 LOZENGE ORAL at 21:44

## 2021-08-05 RX ADMIN — NICOTINE POLACRILEX 4 MG: 4 LOZENGE ORAL at 14:13

## 2021-08-05 RX ADMIN — NICOTINE POLACRILEX 4 MG: 4 LOZENGE ORAL at 16:08

## 2021-08-05 RX ADMIN — NICOTINE POLACRILEX 4 MG: 4 LOZENGE ORAL at 18:34

## 2021-08-05 RX ADMIN — NICOTINE POLACRILEX 4 MG: 4 LOZENGE ORAL at 09:14

## 2021-08-05 RX ADMIN — NICOTINE 1 PATCH: 21 PATCH, EXTENDED RELEASE TRANSDERMAL at 09:14

## 2021-08-05 RX ADMIN — DIVALPROEX SODIUM 750 MG: 250 TABLET, FILM COATED, EXTENDED RELEASE ORAL at 21:44

## 2021-08-05 RX ADMIN — NICOTINE POLACRILEX 4 MG: 4 LOZENGE ORAL at 11:41

## 2021-08-05 RX ADMIN — BUPRENORPHINE AND NALOXONE 3 FILM: 8; 2 FILM BUCCAL; SUBLINGUAL at 12:01

## 2021-08-05 RX ADMIN — NICOTINE POLACRILEX 4 MG: 4 LOZENGE ORAL at 13:07

## 2021-08-05 RX ADMIN — Medication 10 MG: at 21:44

## 2021-08-05 RX ADMIN — NICOTINE POLACRILEX 4 MG: 4 LOZENGE ORAL at 20:08

## 2021-08-05 RX ADMIN — OLANZAPINE 5 MG: 5 TABLET, FILM COATED ORAL at 21:44

## 2021-08-05 RX ADMIN — NICOTINE POLACRILEX 4 MG: 4 LOZENGE ORAL at 22:20

## 2021-08-05 RX ADMIN — LISINOPRIL 10 MG: 10 TABLET ORAL at 09:14

## 2021-08-05 ASSESSMENT — ACTIVITIES OF DAILY LIVING (ADL)
LAUNDRY: WITH SUPERVISION
DIFFICULTY_EATING/SWALLOWING: NO
DOING_ERRANDS_INDEPENDENTLY_DIFFICULTY: NO
HYGIENE/GROOMING: INDEPENDENT
WEAR_GLASSES_OR_BLIND: NO
LAUNDRY: WITH SUPERVISION
DESCRIBE_HEARING_LOSS: HEARING LOSS ON LEFT SIDE
THE_FOLLOWING_AIDS_WERE_PROVIDED;: PATIENT DECLINED OFFER OF AUXILIARY AIDS
PATIENT_/_FAMILY_COMMUNICATION_STYLE: SPOKEN LANGUAGE (ENGLISH OR BILINGUAL)
HEARING_DIFFICULTY_OR_DEAF: YES
WERE_AUXILIARY_AIDS_OFFERED?: NO
ORAL_HYGIENE: INDEPENDENT
DRESSING/BATHING_DIFFICULTY: NO
TOILETING_ISSUES: NO
DIFFICULTY_COMMUNICATING: NO
DRESS: INDEPENDENT
PATIENT'S_PREFERRED_MEANS_OF_COMMUNICATION: VERBAL
DRESS: INDEPENDENT
FALL_HISTORY_WITHIN_LAST_SIX_MONTHS: NO
ORAL_HYGIENE: INDEPENDENT
WALKING_OR_CLIMBING_STAIRS_DIFFICULTY: NO
HYGIENE/GROOMING: INDEPENDENT
CONCENTRATING,_REMEMBERING_OR_MAKING_DECISIONS_DIFFICULTY: NO

## 2021-08-05 ASSESSMENT — MIFFLIN-ST. JEOR: SCORE: 1909.12

## 2021-08-05 NOTE — PLAN OF CARE
"    The patient and/or their representative will achieve their patient-specific goals related to the plan of care.    The patient-specific goals include:     \"Reasons you are in the hospital;\" The patient identifies the following reasons for current hospitalization:   \"I have fears my wife is seeing another person and would like to talk and be open and honest\"      \"Goals for Discharge\" The patient identifies the following goals for discharge:  \"to get sober\"  \"to get healthy and be a healthy father\"    "

## 2021-08-05 NOTE — SAFE
Williams Hemphill  August 4, 2021    Critical Safety Issues: Suicide attempt on 8/2 by overdosing on suboxone.  Experiencing hallucinations.      Current Suicidal Ideation/Self-Injurious Concerns/Methods: Ingestion recent overdose on Suboxone.  Continues to endorse SI.  No specific plan.  Reports feeling unsafe.        Current or Historical Inappropriate Sexual Behavior: Unknown      Current or Historical Aggression/Homicidal Ideation: Agitation/Hyperactivity      Triggers: marital strain, relapse of substances    Updated care team: Yes    For additional details see full LMHP assessment.       Pao Pantoja       None

## 2021-08-05 NOTE — PLAN OF CARE
Problem: Behavioral Health Plan of Care  Goal: Plan of Care Review  Recent Flowsheet Documentation  Taken 8/5/2021 1200 by Mercedez Dodd RN  Plan of Care Reviewed With: patient     Patient spent half of the shift in the milieu.  Did not observe patient interacting with peers.  Patient is observed on the telephone, yelling and cursing.  Patient appeared calm after the telephone call.  Patient COWS and MSSA assessment is completed.  New orders are received.  Patient suboxone was delayed until pharmacy could verify the patient previous prescription dose.  Patient signed a WILFRED, after the form was explained.      Patient denies thoughts of SI, SIB, and hallucinations.  Endorses an improved mood.  Affect is flat and blunted. Speech is clear and thoughts are logical.  Patient signed in vol.  Patient denies any questions or concerns.  Continue with plan of care.

## 2021-08-05 NOTE — PLAN OF CARE
BEHAVIORAL TEAM DISCUSSION    Participants:   Dr. Oliveira, Zeenat Conroy [facility], Mercedez Lee RN      Progress:   Pt presented to the [facility] Emergency Department for evaluation of hallucinations and suicidal ideation.   He endorses auditory and visual hallucinations for the past week.  He notes use of alcohol, meth (smoke, ingestion), heroin (snort, smoke) , Suboxone, and cocaine. Pt reported paranoid symptoms.  He does continue to endorse suicidal thoughts but denies plan. Suicidal with plan to ingest 10 suboxones which was attempted Tuesday 8/3/21 approx 0300  pt admits to etoh, meth (smoke, ingestion), heroin (snort, smoke with meth), suboxone, cocaine addiction; normal usage: 1/4oz meth over 5 days, others, occasional usage.  Last use 3 days ago.      Positive for:  Amphetamine, Cannabinoids,         Pt is on a 72 hour hold but is signing in voluntarily.      Anticipated length of stay:   1 week    Continued Stay Criteria/Rationale:   The pt needs further evaluation of risk of harm to self.    Medical/Physical:   No active issues were discussed.    Precautions:   Behavioral Orders   Procedures     Code 1 - Restrict to Unit     Discontinue 1:1 attendant for suicide risk     Order Specific Question:   I have performed an in person assessment of the patient     Answer:   Based on this assessment the patient no longer requires a one on one attendant at this point in time.     Order Specific Question:   Rationale     Answer:   Patient States able to remain safe in hospital     Routine Programming     As clinically indicated     Status 15     Every 15 minutes.     Suicide precautions     Patients on Suicide Precautions should have a Combination Diet ordered that includes a Diet selection(s) AND a Behavioral Tray selection for Safe Tray - with utensils, or Safe Tray - NO utensils       Plan:   Multidisciplinary evaluation  Medication management   Suboxone - will need provider  Needs to obtain health  insurance  Pt wants to go to [facility] treatment        Rationale for change in precautions or plan: initial review
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
## 2021-08-05 NOTE — PROGRESS NOTES
"SPIRITUAL HEALTH SERVICES  SPIRITUAL ASSESSMENT Progress Note  [employer] [employer] ([facility]) [facility] 30     REFERRAL SOURCE: I did visit this morning patient Williams per [facility]  referral. I did introduced myself as the unit  and shared all the info about the [facility] for the pt. I also encouraged the pt to come to my spirituality group. I also asked him to discussed about his life struggle. Pt said, \"I am okay now but if I do need your help, I will let you know\". I did respect what the pt respond and then left him to be alone in his room.    PLAN: I will remain open to provide spiritual care for the pt as needed.    Anthony Novoa M.Div. (Alem), M.Th., D.Min., [facility]  Staff   Pager [phone]    "

## 2021-08-05 NOTE — PLAN OF CARE
OT NONATTENDANCE NOTE  Problem: OT General Care Plan  Goal: OT Goal 1  Description: OT Goal 1  Will attend OT groups and participate actively in all OT opportunities. Will assess and set goals.    Pt has not attended scheduled occupational therapy sessions. Encourage attendance and participation. Staff did approach for second group of morning as pt in [facility]. He noted plans to attend 1:00 group yet did not do so.
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
## 2021-08-05 NOTE — PROGRESS NOTES
08/05/21 0227   Patient Belongings   Did you bring any home meds/supplements to the hospital?  No   Patient Belongings locker   Patient Belongings Put in Hospital Secure Location (Security or Locker, etc.) cell phone/electronics;clothing;shoes   Belongings Search Yes   Clothing Search Yes   Second Staff Moises PROCTOR   Comment Non  sent to security     Items kept in storage  Sweat pants (hooded), Pants, Shirt, Shoes, Cell phone with , Cigarettes and lighter.    Non sent  to security    No ID, No Credit Card, No Cash    Admission Signature    Patient Signature    _________________    Staff Signature    __________________    Discharge Signature    All my personal  items were returned to me    Patient Signature    __________________    Staff Signature    ___________________

## 2021-08-05 NOTE — ED NOTES
Pt had agreed to go to [facility] and signed the EMTALA form. Pt verbalized concern about paying for the ambulance ride several minutes later. Pt stated he was going to call his wife and let this RN know if he was going to go voluntarily. The ambulance arrived to [facility], pt then refused to go and was requesting to be discharged so his wife could drive him to a treatment facility in [place], [place]. MD was notified. Pt was then placed on a 72-hr hold and given the option to stay at [facility] and wait for a bed to become available at [facility]. Pt was given the 72-hr hold paperwork, pt was calm and accepted the situation.

## 2021-08-05 NOTE — ED NOTES
Patient is angry that he is being transferred to [facility] and refuses to go.  He says he's going to leave.  Per Dr. Zhu, he will need a 72 hour hold for recent suicide attempt this week and suicidal thoughts that are continuing.  We have offered to allow him to sleep in the ED until a mental health bed opens up here - hopefully in the am.  Patient agrees with this plan.  72 hour hold was placed. I did speak to intake who is aware of the patient's wish to stay here.  They will continue to look for a mental health bed here.  Given he will sleep overnight in the ED awaiting inpatient mental health bed here, his daily medications were ordered.      Michaela Graham MD  08/04/21 2130       Michaela Graham MD  08/04/21 2131       Michaela Graham MD  08/04/21 2139
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
## 2021-08-06 ENCOUNTER — TRANSFERRED RECORDS (OUTPATIENT)
Dept: HEALTH INFORMATION MANAGEMENT | Facility: CLINIC | Age: 46
End: 2021-08-06

## 2021-08-06 VITALS
TEMPERATURE: 98.3 F | RESPIRATION RATE: 16 BRPM | DIASTOLIC BLOOD PRESSURE: 82 MMHG | WEIGHT: 222 LBS | HEIGHT: 71 IN | SYSTOLIC BLOOD PRESSURE: 114 MMHG | BODY MASS INDEX: 31.08 KG/M2 | OXYGEN SATURATION: 100 % | HEART RATE: 77 BPM

## 2021-08-06 LAB
CHOLEST SERPL-MCNC: 171 MG/DL
FASTING STATUS PATIENT QL REPORTED: YES
HDLC SERPL-MCNC: 47 MG/DL
LDLC SERPL CALC-MCNC: 84 MG/DL
NONHDLC SERPL-MCNC: 124 MG/DL
TRIGL SERPL-MCNC: 198 MG/DL

## 2021-08-06 PROCEDURE — 36415 COLL VENOUS BLD VENIPUNCTURE: CPT | Performed by: PSYCHIATRY & NEUROLOGY

## 2021-08-06 PROCEDURE — 250N000013 HC RX MED GY IP 250 OP 250 PS 637: Performed by: EMERGENCY MEDICINE

## 2021-08-06 PROCEDURE — 250N000013 HC RX MED GY IP 250 OP 250 PS 637: Performed by: PSYCHIATRY & NEUROLOGY

## 2021-08-06 PROCEDURE — 99239 HOSP IP/OBS DSCHRG MGMT >30: CPT | Performed by: PSYCHIATRY & NEUROLOGY

## 2021-08-06 PROCEDURE — 80061 LIPID PANEL: CPT | Performed by: PSYCHIATRY & NEUROLOGY

## 2021-08-06 RX ORDER — OLANZAPINE 5 MG/1
5 TABLET ORAL AT BEDTIME
Qty: 7 TABLET | Refills: 0 | Status: SHIPPED | OUTPATIENT
Start: 2021-08-06

## 2021-08-06 RX ORDER — BUPRENORPHINE AND NALOXONE 8; 2 MG/1; MG/1
3 FILM, SOLUBLE BUCCAL; SUBLINGUAL DAILY
Qty: 21 FILM | Refills: 0 | Status: SHIPPED | OUTPATIENT
Start: 2021-08-06 | End: 2021-08-06

## 2021-08-06 RX ORDER — LISINOPRIL 10 MG/1
10 TABLET ORAL DAILY
Qty: 7 TABLET | Refills: 0 | Status: SHIPPED | OUTPATIENT
Start: 2021-08-06

## 2021-08-06 RX ORDER — DIVALPROEX SODIUM 250 MG/1
750 TABLET, EXTENDED RELEASE ORAL AT BEDTIME
Qty: 21 TABLET | Refills: 0 | Status: SHIPPED | OUTPATIENT
Start: 2021-08-06

## 2021-08-06 RX ORDER — BUPRENORPHINE AND NALOXONE 8; 2 MG/1; MG/1
3 FILM, SOLUBLE BUCCAL; SUBLINGUAL DAILY
Qty: 18 FILM | Refills: 0 | Status: SHIPPED | OUTPATIENT
Start: 2021-08-06

## 2021-08-06 RX ADMIN — NICOTINE 1 PATCH: 21 PATCH, EXTENDED RELEASE TRANSDERMAL at 08:48

## 2021-08-06 RX ADMIN — BUPRENORPHINE AND NALOXONE 3 FILM: 8; 2 FILM BUCCAL; SUBLINGUAL at 08:48

## 2021-08-06 RX ADMIN — NICOTINE POLACRILEX 4 MG: 4 LOZENGE ORAL at 13:17

## 2021-08-06 RX ADMIN — NICOTINE POLACRILEX 4 MG: 4 LOZENGE ORAL at 14:47

## 2021-08-06 RX ADMIN — LISINOPRIL 10 MG: 10 TABLET ORAL at 08:48

## 2021-08-06 RX ADMIN — NICOTINE POLACRILEX 4 MG: 4 LOZENGE ORAL at 08:49

## 2021-08-06 RX ADMIN — NICOTINE POLACRILEX 4 MG: 4 LOZENGE ORAL at 09:58

## 2021-08-06 RX ADMIN — NICOTINE POLACRILEX 4 MG: 4 LOZENGE ORAL at 12:15

## 2021-08-06 NOTE — PLAN OF CARE
Problem: Sleep Disturbance  Goal: Adequate Sleep/Rest  Outcome: Adequate for Discharge     Problem: Suicidal Behavior  Goal: Suicidal Behavior is Absent or Managed  Outcome: Adequate for Discharge    Pt to discharge this shift. Pt reports satisfaction with this plan.   Denies suicidal ideation, self-harm thoughts, and homicidal ideation. Denies AH/VH.  Discharge medications and AVS reviewed with pt who reports understanding. Plans to follow-up with outpt providers and appts scheduled on AVS.  Belongings sent with pt. Medications sent with patient  Being transported by [facility]

## 2021-08-06 NOTE — PLAN OF CARE
"  Problem: Suicidal Behavior  Goal: Suicidal Behavior is Absent or Managed  Outcome: Improving    Williams reports \"This was the best day of my life in a very long time.\" his wife visited this evening and he stated \"That went really well.\" Reports that he is appreciative of the care he has been receiving. Is hoping to go to CD. Treatment. Reports he has had 17 years of sobriety and started using again 6 months ago. He denies any SI/SIB/AH/VH. Nursing to continue to support current plan of care.     "

## 2021-08-06 NOTE — PLAN OF CARE
Assessment/Intervention/Current Symtoms and Care Coordination  -Chart review  -Pre round meeting with team  -Rounded with team, addressed patient needs/concerns  -Post round meeting with team  Current Symptoms include the following:      Almas Poste in  note as to what happened after I transferred him to the . attempted to call pt but was told patients are unavailable to speak on the phone at this time. FC will try later.      I called [facility]  Phone: [phone]  Fax: [phone].    They said they do offer services. They want the pt to call and they will ask questions.  They will send a packet of information and they agreed it could be sent to my e-mail today.  I prepared a contact sheet for the p to call.    I approached pt to discuss this @9:45 and he is in community meeting.      I will see if the pt agrees to return to his PCP in [facility]. They may take him back without insurance as he is a known pt.    I approached the pt at 10:45AM to help him make calls to [place] county and he is sleeping soundly and not attending the OT group that was happening.  Pt is still sleeping at 11:30AM.      I met with the pt at noon. He said he wanted to leave today because of his insurance and he was racking up a big bill.  We called the Financial Counselor Manpreet and ricardo shaw able to reach him.  He thought he could return to [facility] for suboxone.  He agreed to go to his PCP.  We called [facility] together for [facility] treatment. They agreed to send a packet of information and call him on Monday.  Pt called his sister [phone] in [place] and she agreed to come get him and let him stay there. She said she could pikc him up at 5.      We called [facility] and they scheduled him for August 13, wanting him to be given suboxone only through August 12.  Pt completed the [facility] paperwork and I faxed this back to them.  I have the  [facility]] to get an appointment for the pt on Monday to get the depakote level read and get refills. The nurse for Dr. Harris there is reviewing the case to see if he can get one of the same day visits.          Discharge Plan or Goal  Pending stabilization & development of a safe discharge plan.  Considerations include: Return home with outpatient providers            Behavioral Discharge Planning and Instructions    Summary: You were admitted on 8/4/2021  due to suicidal ideation in the context of drug relapse and psyhcotic symtpoms..  You were treated by Dr. Oliveira. You wanted to go to substance use treatment. You did not participate in the group programming. You were started on Depakote and said you were feeling much better. You did not have any health insurance. You did not want to incur a large bill here and asked for discharge. Outpatient services were arranged. You were discharged on 8/6/21 from [facility] to your sister's home in [facility]. Álvaro @ [phone]. She came to pick you up.      Main Diagnosis:   Bipolar affective disorder, type 2, severe, current episode mixed  Suicidal ideation s/p overdose with intent of self harm  Unspecified psychosis, substance induced vs psychotic features of mood disorder  Polysubstance abuse  Opiate use disorder, severe, has been on MAT with suboxone   Stimulant use disorder, amphetamine and cocaine  History of JANNIE  Hypertension   Tobacco use disorder      Health Care Follow-up:     You do not have health insurance but expect to have [employer] insurance through your jpob in the next 2 weeks. The [facility] Financial Counseling Office can be reachd at [phone] should you have any questions about your bill here.      You have been referred to [facility]. You completed the paperwork for this. They are going to call you Monday, August 9 to schedule services.  [facility]  Phone:  [phone]        Attend your primary care appointment on Monday, August 9, 2021 @ 5:30PM. Dr. Harris will take a depakote level. You can talk to her about getting refills for your medications. You also said she would be able to give you referrals if needed.  08/09/2021 Appointment Family Medicine Lela Harris, DO    [address] [place] [address]    [phone] (Work)    [phone] (Fax)             Attend your suboxone appointment on Friday, August 13, 2021 @ 7AM.  [facility]  [address] [place] [address]  Phone: [phone]  The Health Unit Coordinator has faxed these discharge instructions to fax: [phone]        Attend all scheduled appointments with your outpatient providers. Call at least 24 hours in advance if you need to reschedule an appointment to ensure continued access to your outpatient providers.     Major Treatments, Procedures and Findings:  You were provided with: a psychiatric assessment, assessed for medical stability, medication evaluation and/or management, group therapy, CD evaluation/assessment and milieu management    You had a Covid-19 test and this was negsative.  SARS CoV2 PCR Negative Negative     Comment: NEGATIVE: SARS-CoV-2 (COVID-19) RNA not detected, presumed negative       You were started on Depakote and this level has to be monitored via lab draws.    You had labs drawn.      Your BAL was:  Alcohol Breath Test 0.00 - 0.01 0.00          You were positive for:  Buprenorphine Qual Urine Screen Negative Screen PositiveAbnormal       Amphetamines Urine Screen Negative Screen PositiveAbnormal      Cannabinoids Urine Screen Negative Screen PositiveAbnormal           Symptoms to Report: mood getting worse, thoughts of suicide or urges to use drugs    Early warning signs can include: increased thoughts or behaviors of suicide or self-harm  increased unusual thinking, such as paranoia or hearing voices    Safety and Wellness:  Take all  medicines as directed.  Make no changes unless your doctor suggests them.      Follow treatment recommendations.  Refrain from alcohol and non-prescribed drugs.  If there is a concern for safety, call 911.    Resources:   [place] [employer] [place] (www.mn.gilma.org): 485.860.9846 or 752-026-1643.  Alcoholics Anonymous (www.alcoholics-anonymous.org): Check your phone book for your local chapter.    MENTAL HEALTH  [facility] offers a variety of services and treatment approaches. Some after office hour s appointments are available.  [place] [address]  Phone: [phone]  Fax: [phone]'    BEHAVIORAL HEALTH EMERGENCY SERVICES  The Behavioral Health Emergency Services or Crisis Program provides services to individuals 24 hours a day, 7 days a week through a contract with [facility] by dialing 911 and asking for the on call Behavioral Health worker. These services are offered in homes, police departments, hospitals, schools and other community locations.    Emergency Services include:  Coordination of out-of-home placements including psychiatric hospitalization, if necessary  Individual and family crisis assessments interventions  Mental Health crisis consultation (by calling 433)  Mobile on-site responds to crisis situations to assess risk to self or others  Telephone crisis intervention  For Non-Emergency Services please call the Access Line to set up an appointment or get information on resources at [phone]      General Medication Instructions:   See your medication sheet(s) for instructions.   Take all medicines as directed.  Make no changes unless your doctor suggests them.   Go to all your doctor visits.  Be sure to have all your required lab tests. This way, your medicines can be refilled on time.  Do not use any drugs not prescribed by your doctor.  Avoid alcohol.    Advance Directives:   Scanned document on file with [facility]? No scanned doc  Is document  scanned? Pt states no documents  Honoring Choices Your Rights Handout: Informed and given  Was more information offered? Pt declined      The Treatment team has appreciated the opportunity to work with you. If you have any questions or concerns about your recent admission, you can contact the unit which can receive your call 24 hours a day, 7 days a week. They will be able to get in touch with a Provider if needed. The unit number is [phone] .      Barriers to Discharge  Patient requires further psychiatric stabilization due to current symptomology    Referral Status  unable to send referrals due to lack of insurance    Legal Status  Patient is voluntary

## 2021-08-06 NOTE — DISCHARGE SUMMARY
"Psychiatric Discharge Summary    Williams Hemphill MRN# [id number]   Age: 45 year old YOB: 1975     Date of Admission:  8/4/2021  Date of Discharge:  8/6/2021  Admitting Physician:  Kanwal Oliveira DO  Discharge Physician:  Kanwal Oliveira DO         Event Leading to Hospitalization:   Williams is a 45 year old male with history of bipolar disorder, anxiety and polysubstance use who presented to ED with SI and psychosis in context of increased stressors and recent intentional overdose on suboxone.     Per ED physician note: Williams Hemphill is a 45 year old male with a past medical history significant for polysubstance abuse and depression who presents to the Emergency Department for evaluation of hallucinations and suicidal ideation.  He endorses auditory and visual hallucinations for the past week.  He notes use of alcohol, meth (smoke, ingestion), heroin (snort, smoke) , Suboxone, and cocaine.  He has used 1/4 ounce meth over the past 5 days and notes occasional usage of other substances.  Was recently seen on 8/1 and 8/2 in different ERs.  He presents today on his own and states that he was not very honest with his last visits.  He states that he needs help as he has relapsed on drugs.  When patient was seen on Monday he felt that his wife and children were doing something with the security camera video to make him feel crazy.  During this visit he was able to reach his family and told his mother that if no one answered he would blow his head off. He was prescribed Zyprexa during this visit.  Following discharge, he stayed in a motor home where he continued to experience auditory and visual hallucinations.  He states that branches were turning into snakes and he was hearing whispers.  Patient took 10 8 mg Suboxone pills in an attempt to end his life and \"make everything go away\".  Yesterday morning, he woke up with that headache and felt woozy.  His friends came to visit and he smoked " "marijuana which made him feel better.  He dropped off his Zyprexa prescription but has not filled it yet.  He has continued to have visual and auditory hallucinations.  His wife left him in December 2020.  They ended up getting back together and purchased a new house, he got a new job, and their kids began going to a new school.  He decided to come off of Suboxone 6-7 weeks ago which he states was worse than anything he has ever experienced.  He was receiving suboxone from [place]. His last cocaine use was 1 week ago.  He uses marijuana daily with his last marijuana use today.  His last amphetamine use and alcohol use was 3 days ago.  He does continue to endorse suicidal thoughts but denies plan.  He follows this with stating that he \"wants everything to end\".  He states that he does not want to hurt his wife or kids and would like to receive help.  He has 1 prior suicide attempt in his mid 20s by overdose.     Upon interview he reports that he has a history of bipolar disorder and substance use, first hospitalized 17 years ago following an intentional overdose. He has episodes of 3 days with no sleep, racing thoughts, spending money and impulsivity, these even happen when he has been sober. Last manic episode was 6 months ago, prior to this was a couple years ago. He was treatment with lithium, Trileptal and paroxetine in the past. Has not been on medications recently. He reports that when he got established on Suboxone that it had a mood stabilizing effect on him and did not experience as many ups and downs in his mood. He was following with [facility] but then missed appointments and has been obtaining suboxone illicitly. In December he states he \"had a breakdown\" due to  from his wife, he relapsed during this time, has been using a quarter ounce of methamphetamine every 4-5 days, suboxone 24mg daily, at times heroin as well, last use several days ago, using \"an 8 ball\" over 6 weeks. 2 grams of cocaine " per week, 3-4 drinks of alcohol per week. Denies any IVDU recently, did engage in IVDU 16 years ago. He denies daily alcohol use. Does report a history of a seizure when using methamphetamine in the past. Denies recent seizures. Has completed residential treatment in the past, had 17 years of sobriety from [id number]; open to exploring CD options. He started to have worsening depression with his relapse and inability to maintain sobriety, he was isolating more, and suicidal thoughts started in January. He has a history of previous attempts, last being 16 years ago via overdose. He denies current SI, states he that his kids are a protective factor for him and the further he gets from his last use of substances the more improved his mood is. He was also having psychosis symptoms over the past week, experiencing some AVH and paranoia. He also was having thoughts he was on a TV show, he has not had history of psychosis when sober. He was recently prescribed olanzapine but has not filled prescription. He reports he woke up with some blurry vision, this has happened before when withdrawing, denies other vision changes. He will alert staff if worsens. He also has chronic R shoulder pain from accident. Denies other physical health concerns. He is interested in medications for mood stability, psychosis and CD treatment.        See Admission note by Kanwal Oliveira DO on 8/5/21 for additional details.          Diagnoses:     Bipolar affective disorder, type 2, severe, current episode mixed  Suicidal ideation s/p overdose with intent of self harm  Substance induced psychosis  Polysubstance abuse  Opiate use disorder, severe, has been on MAT with suboxone   Stimulant use disorder, amphetamine and cocaine  History of JANNIE  Hypertension   Tobacco use disorder         Labs:     Recent Results (from the past 168 hour(s))   Alcohol breath test POCT    Collection Time: 08/04/21  2:31 PM   Result Value Ref Range    Alcohol Breath  Test 0.00 0.00 - 0.01   Salicylate level    Collection Time: 08/04/21  2:40 PM   Result Value Ref Range    Salicylate 2 <20 mg/dL   Acetaminophen level    Collection Time: 08/04/21  2:40 PM   Result Value Ref Range    Acetaminophen <2 (L) 10 - 30 mg/L   Extra Green Top (Lithium Heparin) Tube    Collection Time: 08/04/21  2:41 PM   Result Value Ref Range    Hold Specimen [facility]    Comprehensive metabolic panel    Collection Time: 08/04/21  2:41 PM   Result Value Ref Range    Sodium 140 133 - 144 mmol/L    Potassium 4.2 3.4 - 5.3 mmol/L    Chloride 107 94 - 109 mmol/L    Carbon Dioxide (CO2) 29 20 - 32 mmol/L    Anion Gap 4 3 - 14 mmol/L    Urea Nitrogen 12 7 - 30 mg/dL    Creatinine 0.88 0.66 - 1.25 mg/dL    Calcium 9.0 8.5 - 10.1 mg/dL    Glucose 92 70 - 99 mg/dL    Alkaline Phosphatase 61 40 - 150 U/L    AST 13 0 - 45 U/L    ALT 28 0 - 70 U/L    Protein Total 7.3 6.8 - 8.8 g/dL    Albumin 4.0 3.4 - 5.0 g/dL    Bilirubin Total 0.5 0.2 - 1.3 mg/dL    GFR Estimate >90 >60 mL/min/1.73m2   TSH with free T4 reflex    Collection Time: 08/04/21  2:41 PM   Result Value Ref Range    TSH 1.74 0.40 - 4.00 mU/L   Extra Purple Top Tube    Collection Time: 08/04/21  2:42 PM   Result Value Ref Range    Hold Specimen [facility]    Drug abuse screen 1 urine (ED)    Collection Time: 08/04/21  3:12 PM   Result Value Ref Range    Amphetamines Urine Screen Positive (A) Screen Negative    Barbiturates Urine Screen Negative Screen Negative    Benzodiazepines Urine Screen Negative Screen Negative    Cannabinoids Urine Screen Positive (A) Screen Negative    Cocaine Urine Screen Negative Screen Negative    Opiates Urine Screen Negative Screen Negative   Buprenorphine Qual Urine    Collection Time: 08/04/21  3:12 PM   Result Value Ref Range    Buprenorphine Qual Urine Screen Positive (A) Screen Negative   SARS-COV2 (COVID-19) Virus RT-PCR    Collection Time: 08/04/21  5:41 PM    Specimen: Nasopharyngeal; Swab   Result Value Ref Range    SARS CoV2 PCR  Negative Negative   Vitamin D Deficiency    Collection Time: 08/05/21  8:21 AM   Result Value Ref Range    Vitamin D, Total (25-Hydroxy) 29 20 - 75 ug/L   CBC with platelets and differential    Collection Time: 08/05/21  8:21 AM   Result Value Ref Range    WBC Count 7.2 4.0 - 11.0 10e3/uL    RBC Count 5.42 4.40 - 5.90 10e6/uL    Hemoglobin 15.5 13.3 - 17.7 g/dL    Hematocrit 47.6 40.0 - 53.0 %    MCV 88 78 - 100 fL    MCH 28.6 26.5 - 33.0 pg    MCHC 32.6 31.5 - 36.5 g/dL    RDW 13.0 10.0 - 15.0 %    Platelet Count 237 150 - 450 10e3/uL    % Neutrophils 57 %    % Lymphocytes 32 %    % Monocytes 8 %    % Eosinophils 2 %    % Basophils 1 %    % Immature Granulocytes 0 %    NRBCs per 100 WBC 0 <1 /100    Absolute Neutrophils 4.0 1.6 - 8.3 10e3/uL    Absolute Lymphocytes 2.3 0.8 - 5.3 10e3/uL    Absolute Monocytes 0.6 0.0 - 1.3 10e3/uL    Absolute Eosinophils 0.2 0.0 - 0.7 10e3/uL    Absolute Basophils 0.1 0.0 - 0.2 10e3/uL    Absolute Immature Granulocytes 0.0 <=0.0 10e3/uL    Absolute NRBCs 0.0 10e3/uL   Lipid panel    Collection Time: 08/06/21  8:26 AM   Result Value Ref Range    Cholesterol 171 <200 mg/dL    Triglycerides 198 (H) <150 mg/dL    Direct Measure HDL 47 >=40 mg/dL    LDL Cholesterol Calculated 84 <=100 mg/dL    Non HDL Cholesterol 124 <130 mg/dL    Patient Fasting > 8hrs? Yes               Consults:   No consultations were requested during this admission         Hospital Course:   Williams Hemphill was admitted to [facility] with attending Kanwal Oliveira DO on a 72 hour mental health hold, but patient subsequently signed in voluntarily. The patient was placed under status 15 (15 minute checks) to ensure patient safety. Labs obtained as above. On admission interview he described history of jacqueline and depressive episodes going back almost 20 years, even during periods of sobriety. Had been treated with mood stabilizers and antidepressants in the past. He relapsed in December and started to having  increasing SI due to relationship strains and inability to maintain sobriety. He had been using methamphetamine, heroin, cocaine, and alcohol. Hx of seizures from methamphetamine. No seizure in 16 years. He has history of MAT with suboxone, had been obtaining illicitly recently, dose of 24mg daily. He denied having any further suicidal ideation upon admission and voiced regret for his actions and identified several protective factors including his children and his job. Started COWS to monitor for opiate withdrawal, continued previous Suboxone dose, pt did not show any evidence of withdrawal. Discussed R/B/A of options for mood stabilization and psychosis, pt agreeable to start Depakote and continue PTA olanzapine. Pt interested in CD treatment, placed consult, however patient aware that he currently does not have insurance, he expressed concern about expense of ongoing hospitalization and worked to make arrangements for outpatient follow up and to discharge home with his sister. He agreed to follow up with previous Suboxone clinic [facility], appointment made along with appointment with PCP to obtain Depakote Level and further refills of medications.     Today Williams Hemphill reports having no thoughts of harming self or others. In addition, he has notable risk factors for self-harm, including age, psychosis, substance abuse and previous suicide attempts. However, risk is mitigated by patient has remained future oriented, displayed commitment to family, ability to volunteer a safety plan, history of seeking help when needed and current sobriety and engagement with mental health care. Therefore, based on all available evidence including the factors cited above, he does not appear to be at imminent risk for self-harm, does not meet criteria for a 72-hr hold, and therefore remains appropriate for ongoing outpatient level of care.    Williams Hemphill was discharged to home with sister. At the time of discharge  "Williams Hemphill was determined to not be a danger to himself or others.          Discharge Medications:     Current Discharge Medication List      START taking these medications    Details   divalproex sodium extended-release (DEPAKOTE ER) 250 MG 24 hr tablet Take 3 tablets (750 mg) by mouth At Bedtime  Qty: 21 tablet, Refills: 0    Associated Diagnoses: Bipolar 2 disorder (H)         CONTINUE these medications which have CHANGED    Details   buprenorphine HCl-naloxone HCl (SUBOXONE) 8-2 MG per film Place 3 Film under the tongue daily  Qty: 18 Film, Refills: 0    Comments: ANAY: [id number]  Associated Diagnoses: Polysubstance abuse (H)      lisinopril (ZESTRIL) 10 MG tablet Take 1 tablet (10 mg) by mouth daily  Qty: 7 tablet, Refills: 0    Associated Diagnoses: Benign essential hypertension      OLANZapine (ZYPREXA) 5 MG tablet Take 1 tablet (5 mg) by mouth At Bedtime  Qty: 7 tablet, Refills: 0    Associated Diagnoses: Methamphetamine-induced psychotic disorder (H); Oth stimulant depend w stim-induce psychotic disorder, unsp (H)         CONTINUE these medications which have NOT CHANGED    Details   Melatonin 10 MG TABS tablet Take 10 mg by mouth At Bedtime                  Psychiatric Examination:   Appearance:  awake, alert, adequately groomed and dressed in hospital scrubs  Attitude:  cooperative  Eye Contact:  good  Mood:  \"much better\"  Affect:  appropriate and in normal range and mood congruent , brighter  Speech:  clear, coherent and normal prosody  Psychomotor Behavior:  no evidence of tardive dyskinesia, dystonia, or tics  Thought Process:  logical, linear and goal oriented  Associations:  no loose associations  Thought Content:  no evidence of suicidal ideation or homicidal ideation and no evidence of psychotic thought  Insight:  fair  Judgment:  fair, adequate for safety  Oriented to:  time, person, and place  Attention Span and Concentration:  intact  Recent and Remote Memory:  intact  Language: " English, fluent  Fund of Knowledge: appropriate  Muscle Strength and Tone: normal  Gait and Station: Normal         Discharge Plan:   Health Care Follow-up:      You do not have health insurance but expect to have [employer] insurance through your jpob in the next 2 weeks. The [facility] Financial Counseling Office can be reachd at [phone] should you have any questions about your bill here.        You have been referred to [facility]. You completed the paperwork for this. They are going to call you Monday, August 9 to schedule services.  [facility] Behavioral Health Access Line  Phone: [phone]           Attend your primary care appointment on Monday, August 9, 2021 @ 5:30PM. Dr. Harris will take a depakote level. You can talk to her about getting refills for your medications. You also said she would be able to give you referrals if needed.  08/09/2021 Appointment Family Medicine Lela Harris DO    [address] [place] [address]    [phone] (Work)    [phone] (Fax)                 Attend your suboxone appointment on Friday, August 13, 2021 @ 7AM.  [address] [address]  Phone: [phone]  The Health Unit Coordinator has faxed these discharge instructions to fax: [phone]             Attend all scheduled appointments with your outpatient providers. Call at least 24 hours in advance if you need to reschedule an appointment to ensure continued access to your outpatient providers.       Attestation:  Patient has been seen and evaluated by me, Kanwal Oliveira DO on day of discharge. 50 minutes were spent in coordination of discharge planning.

## 2021-08-06 NOTE — DISCHARGE INSTRUCTIONS
Behavioral Discharge Planning and Instructions    Summary: You were admitted on 8/4/2021  due to suicidal ideation in the context of drug relapse and psyhcotic symtpoms..  You were treated by Dr. Oliveira. You wanted to go to substance use treatment. You did not participate in the group programming. You were started on Depakote and said you were feeling much better. You did not have any health insurance. You did not want to incur a large bill here and asked for discharge. Outpatient services were arranged. You were discharged on 8/6/21 [facility] to your sister's home in [facility]. Álvaro @ [phone]. She came to pick you up.      Main Diagnosis:   Bipolar affective disorder, type 2, severe, current episode mixed  Suicidal ideation s/p overdose with intent of self harm  Unspecified psychosis, substance induced vs psychotic features of mood disorder  Polysubstance abuse  Opiate use disorder, severe, has been on MAT with suboxone   Stimulant use disorder, amphetamine and cocaine  History of JANNIE  Hypertension   Tobacco use disorder      Health Care Follow-up:     You do not have health insurance but expect to have [employer] insurance through your jpob in the next 2 weeks. The [facility] Financial Counseling Office can be reachd at [phone] should you have any questions about your bill here.      You have been referred to [facility]stance Use [facility]. You completed the paperwork for this. They are going to call you Monday, August 9 to schedule services.  [facility] Behavioral Health Access Line  Phone: [phone]        Attend your primary care appointment on Monday, August 9, 2021 @ 5:30PM. Dr. Harris will take a depakote level. You can talk to her about getting refills for your medications. You also said she would be able to give you referrals if needed.  08/09/2021 Appointment Family Medicine Lela Harris, DO    [address] [place] [address]    [phone]  (work) [phone] (Fax)             Attend your suboxone appointment on Friday, August 13, 2021 @ 7AM.  [place]    [place]  [address] [place] [address]  Phone: [phone]  The Health Unit Coordinator has faxed these discharge instructions to fax: [phone]        Attend all scheduled appointments with your outpatient providers. Call at least 24 hours in advance if you need to reschedule an appointment to ensure continued access to your outpatient providers.     Major Treatments, Procedures and Findings:  You were provided with: a psychiatric assessment, assessed for medical stability, medication evaluation and/or management, group therapy, CD evaluation/assessment and milieu management    You had a Covid-19 test and this was negsative.  SARS CoV2 PCR Negative Negative     Comment: NEGATIVE: SARS-CoV-2 (COVID-19) RNA not detected, presumed negative       You were started on Depakote and this level has to be monitored via lab draws.    You had labs drawn.      Your BAL was:  Alcohol Breath Test 0.00 - 0.01 0.00          You were positive for:  Buprenorphine Qual Urine Screen Negative Screen PositiveAbnormal       Amphetamines Urine Screen Negative Screen PositiveAbnormal      Cannabinoids Urine Screen Negative Screen PositiveAbnormal           Symptoms to Report: mood getting worse, thoughts of suicide or urges to use drugs    Early warning signs can include: increased thoughts or behaviors of suicide or self-harm  increased unusual thinking, such as paranoia or hearing voices    Safety and Wellness:  Take all medicines as directed.  Make no changes unless your doctor suggests them.      Follow treatment recommendations.  Refrain from alcohol and non-prescribed drugs.  If there is a concern for safety, call [phone].    Resources:   [employer] [place] [employer] Mental [employer]ness (www.mn.gilma.org): 978.665.2870 or 391-044-8212.  Alcoholics Anonymous (www.alcoholics-anonymous.org): Check your phone  book for your local chapter.    MENTAL HEALTH  [facility] offers a variety of services and treatment approaches. Some after office hour s appointments are available.  [place] [address]  Phone: [phone]  Fax: [phone]'    BEHAVIORAL HEALTH EMERGENCY SERVICES  The Behavioral Health Emergency Services or Crisis Program provides services to individuals 24 hours a day, 7 days a week through a contract with [facility] by dialing 911 and asking for the on call Behavioral Health worker. These services are offered in homes, police departments, hospitals, schools and other community locations.    Emergency Services include:  Coordination of out-of-home placements including psychiatric hospitalization, if necessary  Individual and family crisis assessments interventions  Mental Health crisis consultation (by calling 911)  Mobile on-site responds to crisis situations to assess risk to self or others  Telephone crisis intervention  For Non-Emergency Services please call the Access Line to set up an appointment or get information on resources at [phone]      General Medication Instructions:   See your medication sheet(s) for instructions.   Take all medicines as directed.  Make no changes unless your doctor suggests them.   Go to all your doctor visits.  Be sure to have all your required lab tests. This way, your medicines can be refilled on time.  Do not use any drugs not prescribed by your doctor.  Avoid alcohol.    Advance Directives:   Scanned document on file with [place]? No scanned doc  Is document scanned? Pt states no documents  Honoring Choices Your Rights Handout: Informed and given  Was more information offered? Pt declined      The Treatment team has appreciated the opportunity to work with you. If you have any questions or concerns about your recent admission, you can contact the unit which can receive your call 24 hours a day, 7 days a week. They will be able to  get in touch with a Provider if needed. The unit number is [phone] .

## 2021-08-06 NOTE — PLAN OF CARE
Problem: Suicidal Behavior  Goal: Suicidal Behavior is Absent or Managed  Outcome: Improving     Pt has been visible in the milieu.  Pt denies auditory and visual hallucination.  Pt denies all mental health symptoms.  Pt looks forward to discharging today.  Will continue to assess.

## 2021-08-10 ENCOUNTER — TELEPHONE (OUTPATIENT)
Dept: PHARMACY | Facility: CLINIC | Age: 46
End: 2021-08-10

## 2021-08-10 NOTE — TELEPHONE ENCOUNTER
MTM referral from: Transitions of Care (recent hospital discharge or ED visit)    MTM referral outreach attempt #2 on August 10, 2021 at 10:51 AM      Outcome: Patient not reachable after several attempts, will route to MTM Pharmacist/Provider as an FYI. Thank you for the referral.    Gamaliel Phillips, MTM coordinator

## 2021-12-31 ENCOUNTER — HOSPITAL ENCOUNTER (EMERGENCY)
Facility: CLINIC | Age: 46
Discharge: HOME | End: 2021-12-31
Attending: EMERGENCY MEDICINE | Admitting: EMERGENCY MEDICINE
Payer: COMMERCIAL

## 2021-12-31 VITALS
RESPIRATION RATE: 18 BRPM | OXYGEN SATURATION: 99 % | BODY MASS INDEX: 33.6 KG/M2 | WEIGHT: 240 LBS | HEIGHT: 71 IN | HEART RATE: 85 BPM | DIASTOLIC BLOOD PRESSURE: 97 MMHG | SYSTOLIC BLOOD PRESSURE: 146 MMHG | TEMPERATURE: 96.5 F

## 2021-12-31 DIAGNOSIS — F15.10 METHAMPHETAMINE ABUSE (H): ICD-10-CM

## 2021-12-31 DIAGNOSIS — R45.851 SUICIDAL IDEATION: ICD-10-CM

## 2021-12-31 DIAGNOSIS — F32.A DEPRESSION, UNSPECIFIED DEPRESSION TYPE: ICD-10-CM

## 2021-12-31 LAB
ALBUMIN SERPL-MCNC: 3.8 G/DL (ref 3.4–5)
ALBUMIN UR-MCNC: NEGATIVE MG/DL
ALP SERPL-CCNC: 59 U/L (ref 40–150)
ALT SERPL W P-5'-P-CCNC: 39 U/L (ref 0–70)
AMPHETAMINES UR QL SCN: ABNORMAL
ANION GAP SERPL CALCULATED.3IONS-SCNC: 7 MMOL/L (ref 3–14)
APPEARANCE UR: ABNORMAL
AST SERPL W P-5'-P-CCNC: 27 U/L (ref 0–45)
BACTERIA #/AREA URNS HPF: ABNORMAL /HPF
BARBITURATES UR QL: ABNORMAL
BASOPHILS # BLD AUTO: 0.1 10E3/UL (ref 0–0.2)
BASOPHILS NFR BLD AUTO: 1 %
BENZODIAZ UR QL: ABNORMAL
BILIRUB SERPL-MCNC: 1 MG/DL (ref 0.2–1.3)
BILIRUB UR QL STRIP: NEGATIVE
BUN SERPL-MCNC: 17 MG/DL (ref 7–30)
CALCIUM SERPL-MCNC: 9.3 MG/DL (ref 8.5–10.1)
CANNABINOIDS UR QL SCN: ABNORMAL
CHLORIDE BLD-SCNC: 108 MMOL/L (ref 94–109)
CO2 SERPL-SCNC: 26 MMOL/L (ref 20–32)
COCAINE UR QL: ABNORMAL
COLOR UR AUTO: YELLOW
CREAT SERPL-MCNC: 0.8 MG/DL (ref 0.66–1.25)
EOSINOPHIL # BLD AUTO: 0.1 10E3/UL (ref 0–0.7)
EOSINOPHIL NFR BLD AUTO: 1 %
ERYTHROCYTE [DISTWIDTH] IN BLOOD BY AUTOMATED COUNT: 12.8 % (ref 10–15)
GFR SERPL CREATININE-BSD FRML MDRD: >90 ML/MIN/1.73M2
GLUCOSE BLD-MCNC: 110 MG/DL (ref 70–99)
GLUCOSE UR STRIP-MCNC: NEGATIVE MG/DL
HCT VFR BLD AUTO: 48.2 % (ref 40–53)
HGB BLD-MCNC: 16.2 G/DL (ref 13.3–17.7)
HGB UR QL STRIP: NEGATIVE
IMM GRANULOCYTES # BLD: 0 10E3/UL
IMM GRANULOCYTES NFR BLD: 0 %
KETONES UR STRIP-MCNC: NEGATIVE MG/DL
LEUKOCYTE ESTERASE UR QL STRIP: NEGATIVE
LYMPHOCYTES # BLD AUTO: 0.9 10E3/UL (ref 0.8–5.3)
LYMPHOCYTES NFR BLD AUTO: 10 %
MCH RBC QN AUTO: 28.8 PG (ref 26.5–33)
MCHC RBC AUTO-ENTMCNC: 33.6 G/DL (ref 31.5–36.5)
MCV RBC AUTO: 86 FL (ref 78–100)
MONOCYTES # BLD AUTO: 0.6 10E3/UL (ref 0–1.3)
MONOCYTES NFR BLD AUTO: 6 %
MUCOUS THREADS #/AREA URNS LPF: PRESENT /LPF
NEUTROPHILS # BLD AUTO: 7.6 10E3/UL (ref 1.6–8.3)
NEUTROPHILS NFR BLD AUTO: 82 %
NITRATE UR QL: NEGATIVE
NRBC # BLD AUTO: 0 10E3/UL
NRBC BLD AUTO-RTO: 0 /100
OPIATES UR QL SCN: ABNORMAL
PCP UR QL SCN: ABNORMAL
PH UR STRIP: 5 [PH] (ref 5–7)
PLATELET # BLD AUTO: 226 10E3/UL (ref 150–450)
POTASSIUM BLD-SCNC: 4.4 MMOL/L (ref 3.4–5.3)
PROT SERPL-MCNC: 7.5 G/DL (ref 6.8–8.8)
RBC # BLD AUTO: 5.62 10E6/UL (ref 4.4–5.9)
RBC URINE: 2 /HPF
SODIUM SERPL-SCNC: 141 MMOL/L (ref 133–144)
SP GR UR STRIP: 1.03 (ref 1–1.03)
SQUAMOUS EPITHELIAL: 1 /HPF
UROBILINOGEN UR STRIP-MCNC: NORMAL MG/DL
WBC # BLD AUTO: 9.3 10E3/UL (ref 4–11)
WBC URINE: 4 /HPF

## 2021-12-31 PROCEDURE — 80053 COMPREHEN METABOLIC PANEL: CPT | Performed by: EMERGENCY MEDICINE

## 2021-12-31 PROCEDURE — 81001 URINALYSIS AUTO W/SCOPE: CPT | Mod: XU | Performed by: EMERGENCY MEDICINE

## 2021-12-31 PROCEDURE — 99285 EMERGENCY DEPT VISIT HI MDM: CPT | Performed by: EMERGENCY MEDICINE

## 2021-12-31 PROCEDURE — 90791 PSYCH DIAGNOSTIC EVALUATION: CPT

## 2021-12-31 PROCEDURE — 80307 DRUG TEST PRSMV CHEM ANLYZR: CPT | Performed by: EMERGENCY MEDICINE

## 2021-12-31 PROCEDURE — 99285 EMERGENCY DEPT VISIT HI MDM: CPT | Mod: 25

## 2021-12-31 PROCEDURE — 85025 COMPLETE CBC W/AUTO DIFF WBC: CPT | Performed by: EMERGENCY MEDICINE

## 2021-12-31 PROCEDURE — 36415 COLL VENOUS BLD VENIPUNCTURE: CPT | Performed by: EMERGENCY MEDICINE

## 2021-12-31 ASSESSMENT — MIFFLIN-ST. JEOR: SCORE: 1985.76

## 2021-12-31 NOTE — ED TRIAGE NOTES
"Pt here with suicidal ideation. Pt ate a couple 8 balls on Tuesday in an attempt to end his life and \"didn't die\".   "

## 2021-12-31 NOTE — DISCHARGE SUMMARY
Substance Use Treatment (Rule 25) Information -     To access chemical dependency treatment you must have an assessment complete or have an updated assessment within 30 days of starting any program.    Information for this to be completed and to secure funding if you have medical assistance or no insurance can be found through your [facility]'s chemical health intake line.      If you have private insurance contact the customer service number on the back of your insurance card to determine the required steps for accessing services through your insurance provider.     [facility] RULE 25 INFORMATION -   [place] - [phone]  [place]  [phone]  [facility] [phone]  [facility] [phone]  [facility] [phone]  [place] - [phone]  [place] - [phone]  [facility] [phone]      Once approved for funding you can connect with a facility that does Rule 25 assessment.     The following facilities offer Rule 25 assessments -     [facility]  [phone]  [facility] - Outpatient Mental Health and Addiction   [address] [facility] [address]    [facility] Outpatient Alcohol and Drug Abuse Program (ADAP)  [phone]  [address] [place] [address]  *Walk in assessments also available M-F starting at 8 am.     [facility]  [phone]  [address] [facility] [address]       [facility]   [phone]  [facility]  [address] [facility] [address]    [facility]   [phone]  [address] [facility] [address]    [facility]  [phone] - press 1  Multiple clinic locations    [facility]   [phone]  [address] [place] [address] E  [facility] [address]    [facility])  [phone]  [address] [facility] [address]    [facility]  [phone]  [address] [facility] [address]          If you are intoxicated  You may be required to detox at a detox facility  before starting treatment.    [facility]- [address] [facility].  [place].    [phone]    [facility]: [phone]  [facility]: [phone]  [facility]: [phone]      *All information subject to change by facility- connect by phone prior to arrival to verify available services. Information listed is not an endorsement of particular program and is populated using a few of the available resources in the area.     Additional support while to provide support while you await assessment and any recommendations-  [facility] is providing telehealth services during the COVID-19 outbreak to ensure ongoing access to high-quality treatment for substance use and co-occurring mental health conditions.  COVID-19 poses unprecedented challenges to clients and providers. [facility], one of [place] s largest non-profit extended-care behavioral healthcare providers, successfully transitioned more than nine hundred outpatient clients to telehealth. Its licensed clinicians are delivering individual and group therapy via computers, mobile devices, and phones.  [facility] is now extending telehealth services to any adult [place] resident to ensure uninterrupted access to crucial care during this challenging time.    [facility] is accredited by The Joint Commission and utilizes evidence-based care     In-network with most major insurance companies, accept Medical Assistance (i.e. MA/PMAP/CCDTF), and private pay     Conducting virtual assessments and intakes     Individual and group telehealth sessions available     Specialized groups and trauma treatment available     Employing secure telehealth platforms     Partnering with recovery residences in the [facility], [facility], and other [facility] to help clients access high-quality recovery housing when clinically indicated.  Refer a client by contacting a [facility] admissions representative using the following information:    [facility]  "Counseling Center ([address] [place] [address])     Contact: Sridevi Perez, Admissions Supervisor ([email or web address])     Office: [phone] Fax: [phone]    [facility] ([address] [place] [address])     Contact: Sridevi Perez, Admissions Supervisor ([email or web address])     Office: [phone] Fax: [phone]    [facility] ([address] [place] [address])     Contact: Renetta Cruz, Admissions Supervisor ([email or web address])     Office: [phone] Fax: [phone]    [facility] ([address] [place] [address])     Contact: Jacinta Bates, Admissions Supervisor ([email or web address])     Office: [phone] Fax: [phone]      1) Warning Signs.    ((Include thoughts, images, mood, situation, behavior that a crisis may be developing).   Patient reported hallucinating after using meth  2) Things that make me feel better.   (Internal coping strategies - Things I can do to take my mind off my problems without contacting another person (relaxation technique, physical activity). What healthy actions can I take to make myself feel better?   Snow boarding and having family time  3) People and social settings that provide distraction.   a) The goal for this step is distraction from suicidal thoughts/feelings. Ask for at least two locations/people.   Patient answered \"my dad and my wife\"  4) People whom I can ask for help.   a) Who can I talk to that makes me feel better?  Patient's family    5) Professionals or agencies I can contact during a crisis.   a) List names/numbers of the identified providers, as well as any additional crisis lines or professional contacts.   None reported by patient  National Suicide Prevention Lifeline  9.886.159.1235    Crisis Text Line  Text \"[place]\" to  684851    Warmline  813.581.2557 or text  Support  to [address]96[address]   The [place] [facility] provides a txwp-nn-fxbw approach " "to mental health recovery, support and wellness.   Mon-Sat- 5pm-10pm.     6) Ways to improve my environment or surroundings.   \"stop using drugs.\" Follow up with after care plan. .    "

## 2021-12-31 NOTE — ED NOTES
"Patient reports increased suicidal ideation over the past few weeks. hjas dealt with depression and suicidal ideation over the past several years. Was released from rehab for meth a few months ago. Suicide attempt within the past few weeks by \"taking drugs, just hoping I wont wake up.\"  "

## 2021-12-31 NOTE — ED NOTES
"12/31/2021  Williams Hemphill 1975     [facility] Crisis Assessment:    Started at: 12:00 pm   Completed at: 12:45 pm  Patient was assessed via virtually ([employer] cart or other teleconferencing device).  Patient Location: [facility]    Chief Complaint and History of Presenting Problem:    Patient is a 46 year old  male who presented to the ED by Family/Friends related to concerns for drug abuse.  Patient reported to the triage nurse that he was suicidal with a plan to overdose on meth.  During this assessment patient denied being suicidal.  Patient reported both drug induced visual and auditory hallucinations.  Apparently, patient has been to an inpatient treatment and he has relapsed several times. His last use was on Tuesday 12/28/21 after he found out that his son was in an auto accident.  Patient added \"I feel guilty not being by my son's bedside...and he is in a critical condition at the hospital.\". Patient endorsed feeling sad and depressed and denied having suicidal thoughts or plans.  His father who was in the room acknowledged that patient needs to follow up with recommended level of care.  Patient is not on any prescribed medication and has no therapist.  However, patient asked for CD resources.    Assessment and intervention involved meeting with pt, obtaining collateral from [facility]where records and family report, employing crisis psychotherapy including: Assess dimensions of crisis. Collateral information includes family report..     Biopsychosocial Background and Demographic Information    Patient is a self-employed man who lives in [place] with his wife and three children  Ages 18, 16, and 14 years.  Patient has history of abusing meth. Patient has been to  [place] in [place].  Patient has strong family support.     Mental Health History and Current Symptoms     Patient presented with sadness and depression.  Patient endorsed stable mental health but hallucinates when he uses meth.  " "Patient \"chews meth.\"  Patient endorsed poor sleep and great appetite.  He reported high self-esteem and he denied feeling helpless.    Patient identifies historical diagnoses of Drug abuse. At baseline, patient describes their mental health symptoms as stable..     Mental Health History (prior psychiatric hospitalizations, civil commitments, programmatic care, etc):None reported by patient  Family Mental and Chemical Health History: None reported by patient    Current and Historic Psychotropic Medications:None reported by patient   Medication Adherent: NA  Recent medication changes? No    Relevant Medical Concerns  Patient identifies concerns with completing ADLs? No  Patient can ambulate independently? Yes  Other medical health concerns? No  History of concussion or TBI? No     Trauma History   Physical, Emotional, or Sexual abuse: No  Loss of a friend or family member to suicide: Yes  Other identified traumatic event or significant stressor: Yes    Substance Use History and Treatments  Patient attended CD treatment in [place]    Drug screen/BAL/Breathalyzer Completed? No  Results: NA     History of Suicidal Ideation, Suicide Attempts, Non-Suicidal Self Injury, and Risk Formulation:   Details of Current Ideation, Attempt(s), Plan(s): None reported by patient  Risk factors:  history of or current substance use.   Protective factors:  strong bond to family/friends.  History and Prior Methods of Self-injury: None reported by patient  History of Suicide Attempts: None reported by patient    ESS-6  1.a. Over the past 2 weeks, have you had thoughts of killing yourself? Yes   1.b. Have you ever attempted to kill yourself and, if yes, when did this last happen? No  2. Recent or current suicide plan? No  3. Recent or current intent to act on ideation? No  4. Lifetime psychiatric hospitalization? No  5. Pattern of excessive substance use? Yes  6. Current irritability, agitation, or aggression? No  ESS-6 Score: " 1      Other Risk Areas  Aggressive/assumptive/homicidal risk factors: No   Sexually inappropriate behavior? No      Vulnerability to sexual exploitation? No     Clinical Presentation and Current Symptoms   Patient who has a history of drug abuse presented to the ED for CD resources with his father.  Patient denied suicidal ideation.  Patient endorsed apparent hallucinations when he uses meth.  His last use was a couple of days ago.  Patient endorsed sadness because his was was in a motor accident.  Patient reported feeling guilty for not being with his son during this critcial period.      Attention, Hyperactivity, and Impulsivity: No   Anxiety:Yes: Generalized Symptoms: Excessive worry    Behavioral Difficulties: No   Mood Symptoms: Yes: Sad, depressed mood    Appetite: No   Feeding and Eating: No  Interpersonal Functioning: No  Learning Disabilities/Cognitive/Developmental Disorders: No   General Cognitive Impairments: No  If yes, see completed Mini-Cog Assessment below.  Sleep: Yes: Difficulty falling asleep    Psychosis: Yes: Hallucinations: Auditory    Trauma: No       Mental Status Exam:  Affect: Appropriate  Appearance: Appropriate   Attention Span/Concentration: Attentive    Eye Contact: Variable  Fund of Knowledge: Appropriate   Language /Speech Content: Fluent  Language /Speech Volume: Normal   Language /Speech Rate/Productions: Normal   Recent Memory: Intact  Remote Memory: Variable  Mood: Sad   Orientation:   Person: Yes   Place: Yes  Time of Day: Yes   Date: Yes   Situation (Do they understand why they are here?): Yes   Psychomotor Behavior: Normal   Thought Content: Hallucinations  Thought Form: Intact    Screeners (Remove if not applicable - complete as standard for individuals 65+)  NA  Current Providers and Contact Information   Patient is his own legal guardian.     Primary Care Provider: Yes, provider details not recalled  Psychiatrist: No  Therapist: No  : No  CTSS or ARMHS: No  ACT  Team: No  Other: No    Has an WILFRED been signed? No     Clinical Summary and Recommendations    Clinical summary of assessment (include strengths, protective factors, community resources, and assessment of vulnerability/risk): Patient has family support.  Patient is aware of what to do to be sober.  Patient has private insurance and will contact his insurance carrier to recommend in-network services.  Writer added some resources in patient's discharge plan.  Patient will follow up with out-patient services.      Diagnosis with F Codes:  Other stimulant abuse with stimulant-induced mood disorder.  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 Hernandez consulted and does  agree with recommended disposition which includes Substance Abuse Disorder Treatment. Patient agrees with recommended level of care.      Details of final disposition include: Substance abuse disorder treatment . Patient requested for CD treatment resources.  Writer added Rule 25 Assessment information on patient's discharge plan.     If Inpatient, is patient admitted voluntary? N/A   Patient aware of potential for transfer if there is not appropriate placement? NA  Patient is willing to travel outside of the [facility] for placement? NA   Central Intake Notified? NA  If Discharging, what are follow up needs? CD resources  Safety/after care plan provided to Patient by RN    Duration of assessment time: .75 hrs    CPT code(s) utilized: 58173, up to 74 minutes      MAXIM Crowe

## 2021-12-31 NOTE — DISCHARGE INSTRUCTIONS
Return if symptoms worsen or new symptoms develop.  Follow-up with primary care physician next available.  Drink plenty of fluids.  If any further suicidal ideation please return for recheck.  You contract for safety and will return if any further thoughts.  Follow-up with your rehab program immediately.  Substance Use Treatment (Rule 25) Information -     To access chemical dependency treatment you must have an assessment complete or have an updated assessment within 30 days of starting any program.    Information for this to be completed and to secure funding if you have medical assistance or no insurance can be found through your [facility]'s chemical health intake line.      If you have private insurance contact the customer service number on the back of your insurance card to determine the required steps for accessing services through your insurance provider.     [facility] RULE 25 INFORMATION -   [place] - [phone]  [place] - [phone]  [place] - [phone]  [facility] [phone]  [facility] [phone]  [facility] [phone]  [place] - [phone]  [facility] [phone]      Once approved for funding you can connect with a facility that does Rule 25 assessment.     The following facilities offer Rule 25 assessments -     [facility]  [phone]  [facility] - Outpatient Mental Health and Addiction   [address] [place] [address]    [facility] Outpatient Alcohol and Drug Abuse Program (ADAP)  [phone]  [address] [place] [address]  *Walk in assessments also available M-F starting at 8 am.     [facility]  [phone]  [facility] [address]       [facility]   [phone]  [facility]  [address] [facility] [address]    [facility]   [phone]  [address] [place] [address]    [facility]  [phone] - [facility] 1  Multiple clinic locations    [facility]    [phone]  [address] [facility] [address]  [facility] [address]    [facility] ([facility])  [phone]  [address]   [facility] [address]    [facility]  [phone]  [address] [facility] [address]          If you are intoxicated  You may be required to detox at a detox facility before starting treatment.    [facility]- [address] [place] [address].  [place], [place].    [phone]    [facility]: [phone]  [facility]: [phone]  [facility]: [phone]      *All information subject to change by facility- connect by phone prior to arrival to verify available services. Information listed is not an endorsement of particular program and is populated using a few of the available resources in the area.     Additional support while to provide support while you await assessment and any recommendations-  [facility] is providing telehealth services during the COVID-19 outbreak to ensure ongoing access to high-quality treatment for substance use and co-occurring mental health conditions.  COVID-19 poses unprecedented challenges to clients and providers. [facility], one of [place] largest non-profit extended-care behavioral healthcare providers, successfully transitioned more than nine hundred outpatient clients to telehealth. Its licensed clinicians are delivering individual and group therapy via computers, mobile devices, and phones.  [facility] is now extending telehealth services to any adult [place] resident to ensure uninterrupted access to crucial care during this challenging time.  [facility] is accredited by The Joint Commission and utilizes evidence-based care   In-network with most major insurance companies, accept Medical Assistance (i.e. MA/PMAP/CCDTF), and private pay   Conducting virtual assessments and intakes   Individual and group telehealth sessions available   Specialized groups and trauma treatment available   Employing secure telehealth platforms   Partnering with  "recovery residences in the [facility], and other [facility] to help clients access high-quality recovery housing when clinically indicated.  Refer a client by contacting a [facility] admissions representative using the following information:  [facility] ([address] [place] [address])   Contact: Sridevi Perez, Admissions Supervisor ([email or web address])   Office: [phone] Fax: [phone]  [facility] ([address] [place] [address])   Contact: Sridevi Perez, Admissions Supervisor ([email or web address])   Office: [phone] Fax: [phone]  [address] [place] [address] ([address] [place] [address])   Contact: Renetta Cruz, Admissions Supervisor ([email or web address])   Office: [phone] Fax: [phone]  [facility] ([address] [place] [address])   Contact: Jacinta Bates, Admissions Supervisor ([email or web address])   Office: [phone] Fax: [phone]      1) Warning Signs.    ((Include thoughts, images, mood, situation, behavior that a crisis may be developing).   Patient reported hallucinating after using meth  2) Things that make me feel better.   (Internal coping strategies - Things I can do to take my mind off my problems without contacting another person (relaxation technique, physical activity). What healthy actions can I take to make myself feel better?   Snow boarding and having family time  3) People and social settings that provide distraction.   a) The goal for this step is distraction from suicidal thoughts/feelings. Ask for at least two locations/people.   Patient answered \"my dad and my wife\"  4) People whom I can ask for help.   a) Who can I talk to that makes me feel better?  Patient's family    5) Professionals or agencies I can contact during a crisis.   a) List names/numbers of the identified providers, as well as any additional " "crisis lines or professional contacts.   None reported by patient  National Suicide Prevention Lifeline  8.527.908.6293    Crisis Text Line  Text \"[place]\" to  411372    Warmline  604.150.1329 or text  Support  to 278[address]   The [place] Warmline provides a dozq-qc-cxwp approach to mental health recovery, support and wellness.   Mon-Sat- 5pm-10pm.     6) Ways to improve my environment or surroundings.   \"stop using drugs.\" Follow up with after care plan.    "
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
